# Patient Record
Sex: MALE | Race: WHITE | NOT HISPANIC OR LATINO | Employment: PART TIME | URBAN - METROPOLITAN AREA
[De-identification: names, ages, dates, MRNs, and addresses within clinical notes are randomized per-mention and may not be internally consistent; named-entity substitution may affect disease eponyms.]

---

## 2017-04-10 ENCOUNTER — HOSPITAL ENCOUNTER (EMERGENCY)
Facility: CLINIC | Age: 27
Discharge: HOME | End: 2017-04-10
Attending: EMERGENCY MEDICINE | Admitting: EMERGENCY MEDICINE
Payer: OTHER MISCELLANEOUS

## 2017-04-10 ENCOUNTER — APPOINTMENT (OUTPATIENT)
Dept: GENERAL RADIOLOGY | Facility: CLINIC | Age: 27
End: 2017-04-10
Attending: EMERGENCY MEDICINE
Payer: OTHER MISCELLANEOUS

## 2017-04-10 VITALS
TEMPERATURE: 97.6 F | RESPIRATION RATE: 18 BRPM | DIASTOLIC BLOOD PRESSURE: 90 MMHG | OXYGEN SATURATION: 98 % | SYSTOLIC BLOOD PRESSURE: 138 MMHG | HEART RATE: 103 BPM

## 2017-04-10 DIAGNOSIS — M25.531 RIGHT WRIST PAIN: ICD-10-CM

## 2017-04-10 DIAGNOSIS — M54.2 ACUTE NECK PAIN: ICD-10-CM

## 2017-04-10 PROCEDURE — 99283 EMERGENCY DEPT VISIT LOW MDM: CPT

## 2017-04-10 PROCEDURE — 73110 X-RAY EXAM OF WRIST: CPT | Mod: RT

## 2017-04-10 RX ORDER — METHYLPREDNISOLONE 4 MG
4 TABLET, DOSE PACK ORAL SEE ADMIN INSTRUCTIONS
Qty: 21 TABLET | Refills: 0 | Status: SHIPPED | OUTPATIENT
Start: 2017-04-10 | End: 2018-03-19

## 2017-04-10 RX ORDER — IBUPROFEN 600 MG/1
600 TABLET, FILM COATED ORAL ONCE
Status: DISCONTINUED | OUTPATIENT
Start: 2017-04-10 | End: 2017-04-10 | Stop reason: HOSPADM

## 2017-04-10 RX ORDER — CYCLOBENZAPRINE HCL 10 MG
10 TABLET ORAL 3 TIMES DAILY PRN
Qty: 20 TABLET | Refills: 0 | Status: SHIPPED | OUTPATIENT
Start: 2017-04-10 | End: 2017-04-16

## 2017-04-10 ASSESSMENT — ENCOUNTER SYMPTOMS
ARTHRALGIAS: 1
MYALGIAS: 1
NUMBNESS: 1

## 2017-04-10 NOTE — DISCHARGE INSTRUCTIONS
Discharge Instructions  Extremity Injury    You were seen today for an injury to an extremity (arm, hand, leg, or foot).   Return to the Emergency Department or see your regular doctor if your injured area is not back to normal within 5-7 days.    Return to the Emergency Department right away if:    Your pain seems to change or get worse or there is pain in a new area.    Your extremity becomes pale, cool, blue, or numb or tingling past the injury.    You have more drainage, redness or pain in the area of the cut or abrasion.    You have pain that you can t control with the medicine recommended or prescribed here, or you have pain that seems too much for your injury.    Your child will not stop crying or is much more fussy than normal.    You have new symptoms or anything that worries you.    What to Expect:    Your swelling and pain may be worse the day after your injury, but should not be severe and should start getting better after that. You should not have new symptoms and your pain should not get worse.    You may start to get a bruise over the injured area or below the injured area.    Your movement and strength should get better with time.    Some injuries may not show up until after you have left the Emergency Department so it is important to follow-up with your regular doctor.    Your injury may prevent you from working.  Follow-up with your regular doctor to get a work release note.    Pain medications or your injury may make it unsafe to drive or operate machinery.    Home Care:    Apply ice your injured area for 15 minutes at a time, at least 3 times a day. Use a cloth between the ice bag and your skin to prevent frostbite.     Do not sleep with an ice pack or heating pad on, since this can cause burns or skin injury.    Rest your injured area for at least 1-2 days. After that you may start using your extremity again as long as there is not too much pain.     Raise the injured area above the level of  your heart as much as possible in the first 1-2 days.    Use Tylenol  (acetaminophen), Motrin (ibuprofen), or Advil  (ibuprofen) for your pain unless you have an allergy or are told not to use these medications by your doctor.  Take the medications as instructed on the package. Tylenol  (acetaminophen) is in many prescription medicines and non-prescription medicines--check all of your medicines to be sure you aren t taking more than 3000 mg per day.    You may use an elastic bandage (Ace  Wrap) if it makes you more comfortable. Wrap it just tight enough to provide light compression, like a new pair of socks feels. Loosen the bandage if you have swelling past the bandage.      Please follow any other instructions that were discussed with you by your doctor.    MORE INFORMATION:    X-rays:  X-rays done today were read by your doctor but will also be read by a radiologist.  We will contact you if the radiologist sees anything different on the x-ray.  Your regular doctor may also want to review your x-rays on follow-up.    You could have a fracture (break), even if we told you your x-rays were normal. X-rays are not always certain, and some fractures are hard to see and may not show up right away.  Also, your x-ray may look like you have a fracture, even though you do not.  It is important to follow-up with your regular doctor.     Stretching:  If your injury was to your arm or shoulder and your doctor put you in a sling or an immobilizer, it is important that you take off your immobilizer within 3 days and stretch/move your shoulder, unless your doctor specifically tells you to not move your shoulder.  This is to prevent further injury such as a  frozen shoulder .     If you were given a prescription for medicine here today, be sure to read all of the information (including the package insert) that comes with your prescription.  This will include important information about the medicine, its side effects, and any  warnings that you need to know about.  The pharmacist who fills the prescription can provide more information and answer questions you may have about the medicine.  If you have questions or concerns that the pharmacist cannot address, please call or return to the Emergency Department.     Remember that you can always come back to the Emergency Department if you are not able to see your regular doctor in the amount of time listed above, if you get any new symptoms, or if there is anything that worries you.      Discharge Instructions  Neck Strain    You have been seen today for a neck sprain or strain.  Neck strains usually result from an injury to the neck. Car accidents, contact sports and falls are common causes of neck strain. Sometimes your neck can start to hurt because of increased activity, muscle tension, an abnormal sleeping position, or because of other problems like arthritis in the neck.     Neck pain usually comes from injured muscles and ligaments. Sometimes there is a herniated ( slipped ) disc. We don t usually do MRI scans to look for these right away, since most herniated discs will get better on their own with time. Today, we did not find any evidence that your neck pain was caused by a serious condition, such as an infection, fracture, or tumor. However, sometimes symptoms develop over time and cannot be found during an emergency visit, so it is very important that you follow up with your primary doctor.    Return to the Emergency Department if:    You have increasing pain in your neck.    You develop difficulty swallowing or breathing.    You have numbness, weakness, or trouble moving your arms or legs.    You have severe dizziness and difficulty walking.    You are unable to control your bladder or bowels.    You develop severe headache or ringing in the ears.    Call your doctor if:     Your neck pain is not controlled with the medicine we gave you.    You are not back to normal within 1  week.    What can I do to help myself at home?    If you had an injury, use cold for the first 1-2 days. Cold helps relieve pain and reduce inflammation.  Apply ice packs to the neck or areas of pain every 1-2 hours for 20 minutes at a time. Place a towel or cloth between your skin and the ice pack.    After the first 2 days, using heat can help with neck pain and stiffness. You may use a warm shower or bath, warm towels on the neck, or a heating pad. Do not sleep with a heating pad, as you can be burned.     Pain medications - You may take a pain medication such as Tylenol  (acetaminophen), Advil , Nuprin  (ibuprofen) or Aleve  (naproxen).  If you have been given a narcotic such as Vicodin  (hydrocodone with acetaminophen), Percocet  (oxycodone with acetaminophen), codeine, or a muscle relaxant such as Flexeril  (cyclobenzaprine) or Soma  (carisoprodol), do not drive for four hours after you have taken it. If the narcotic contains Tylenol  (acetaminophen), do not take Tylenol  with it. All narcotics will cause constipation, so eat a high fiber diet.      It is usually best to rest the neck for 1-2 days after an injury, then start gentle stretching exercises.     It is helpful to place a small pillow under the nape of your neck to provide proper neutral positioning.     You should stay active and do your usual work as much as you can, unless this involves heavy physical labor. Ask your doctor if you need work restrictions.  If you were given a prescription for medicine here today, be sure to read all of the information (including the package insert) that comes with your prescription.  This will include important information about the medicine, its side effects, and any warnings that you need to know about.  The pharmacist who fills the prescription can provide more information and answer questions you may have about the medicine.  If you have questions or concerns that the pharmacist cannot address, please call or  return to the Emergency Department.

## 2017-04-10 NOTE — ED AVS SNAPSHOT
[facility] Emergency Department    [address]    [facility] [address]    Phone:  [phone]    Fax:  [phone]                                       Vijay Garg   MRN: [id number]    Department:  [facility] Emergency Department   Date of Visit:  4/10/2017           After Visit Summary Signature Page     I have received my discharge instructions, and my questions have been answered. I have discussed any challenges I see with this plan with the nurse or doctor.    ..........................................................................................................................................  Patient/Patient Representative Signature      ..........................................................................................................................................  Patient Representative Print Name and Relationship to Patient    ..................................................               ................................................  Date                                            Time    ..........................................................................................................................................  Reviewed by Signature/Title    ...................................................              ..............................................  Date                                                            Time

## 2017-04-10 NOTE — ED PROVIDER NOTES
History     Chief Complaint:  Arm Pain      HPI   Vijay Garg is a 26 year old male who presents for evaluation of arm pain. The patient has a longstanding history of right forearm and wrist pain with numbness. The patient has been evaluated in the ED regarding this pain and numbness multiple times, once in November of 2015 and again in March of 2016. He reports that he has further been evaluated with testing for carpal tunnel, which was negative. He has also been seeing a chiropractor regarding this pain. Initially, he reports that he had some minor improvement of his pain with chiropractic adjustments, and he reports that his chiropractor expressed concern that his radius could be applying pressure to nerves, causing his pain. Today, due to ongoing intolerable pain, the patient presents to the ED seeking help with pain management and wondering what further resources are available to him. Currently in the ED, the patient rates his pain at a severity of 10/10. 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 He reports that he has never had an X-ray of his wrist to evaluate his pain.     Allergies:  NKDA     Medications:    The patient is not currently taking any prescribed medications.     Past Medical History:    Ibuprofen     Past Surgical History:    History reviewed. No pertinent past surgical history.     Family History:    History reviewed. No pertinent family history.     Social History:  Tobacco use:    Current every day smoker  Alcohol use:    Positive  Marital status:    Single   Accompanied to ED by:  Alone   Occupation:    The patient is a       Review of Systems   Musculoskeletal: Positive for arthralgias (right wrist) and myalgias (right forearm).   Neurological: Positive for numbness (right arm).   All other systems reviewed and are negative.    Physical  Exam   First Vitals:  BP: 138/90  Pulse: 103  Temp: 97.6  F (36.4  C)  Resp: 16  SpO2: 98 %      Physical Exam  Constitutional:  Oriented to person, place, and time. Well-appearing.   HENT:   Head:    Normocephalic.   Mouth/Throat:   Oropharynx is clear and moist.   Eyes:    EOM are normal. Pupils are equal, round, and reactive to light.   Neck:    Neck supple.   Musculoskeletal:  Normal range of motion.      2+ distal pulses.     No midline spinal tenderness.     Right cervical and trapezius paraspinal tenderness with spasming noted.      Mild tenderness to right wrist. Full range of motion. No deformity. No swelling or warmth.     5/5 strength, and sensation is fully intact to bilateral upper extremities.   Neurological:   Alert and oriented to person, place, and time.           Moves all 4 extremities spontaneously    Skin:    No rash noted. No pallor.      No swelling, erythema, or warmth noted to right wrist.     Emergency Department Course     Imaging:  Radiographic findings were communicated with the patient who voiced understanding of the findings.    Wrist XR:  IMPRESSION: No fractures are seen in the right wrist. Joints are preserved and in normal alignment.  Per radiology.     Emergency Department Course:  Nursing notes and vitals reviewed.  1058: I performed an exam of the patient as documented above.     Findings and plan explained to the Patient. Patient discharged home with instructions regarding supportive care, medications, and reasons to return. The importance of close follow-up was reviewed. The patient was prescribed Flexeril and a Medrol dosepak.      Impression & Plan      Medical Decision Making:  Vijay Garg is a 26 year old male who has been having ongoing right-sided neck and wrist pain. Differential includes peripheral neuropathy, referred neck pain, radiculopathy, or other causes. He has no acute weakness or numbness. He has no swelling or warmth and no concerns for septic arthritis or  gout. No concerns for neurologic compromise. No traumatic injury and/or need for imaging of the neck at this time. I did end up getting an X-ray which shows no acute findings. He does have a volar wrist splint, which he was advised to use. He will be discharged home on a course of steroids as well as flexeril for possible radiculopathy and is being referred to neurology for further workup. He was told to return for any acute weakness, numbness, or extreme pain.     Diagnosis:    ICD-10-CM   1. Right wrist pain M25.531   2. Acute neck pain M54.2       Disposition:  Discharged to home with Flexeril and a Medrol dosepak. Follow up with PMD.     Discharge Medications:  Discharge Medication List as of 4/10/2017 12:08 PM      START taking these medications    Details   cyclobenzaprine (FLEXERIL) 10 MG tablet Take 1 tablet (10 mg) by mouth 3 times daily as needed for muscle spasms, Disp-20 tablet, R-0, Local Print      methylPREDNISolone (MEDROL DOSEPAK) 4 MG tablet Take 1 tablet (4 mg) by mouth See Admin Instructions, Disp-21 tablet, R-0, Local Print             IPancho, am serving as a scribe at 10:58 AM on 4/10/2017 to document services personally performed by Dr. Stephenson, based on my observations and the provider's statements to me.    [facility] EMERGENCY DEPARTMENT       Kiel Stephenson MD  04/10/17 [id number]

## 2017-04-10 NOTE — ED NOTES
Patient presents to the ED with right arm pain. [facility] has been having problems with the arm for awhile due to a work injury. [facility] has been seeing chiropractor for arm pain and numbness.

## 2017-04-10 NOTE — ED AVS SNAPSHOT
[facility] Emergency Department    [address]    [facility] [address]    Phone:  [phone]    Fax:  [phone]                                       Vijay Garg   MRN: [id number]    Department:  [facility] Emergency Department   Date of Visit:  4/10/2017           Patient Information     Date Of Birth          1990        Your diagnoses for this visit were:     Right wrist pain     Acute neck pain        You were seen by Kiel Stephenson MD.      Follow-up Information     Follow up with [facility]. Call in 1 day.    Contact information:    [address] [facility] [id number] [phone]        Discharge Instructions         Discharge Instructions  Extremity Injury    You were seen today for an injury to an extremity (arm, hand, leg, or foot).   Return to the Emergency Department or see your regular doctor if your injured area is not back to normal within 5-7 days.    Return to the Emergency Department right away if:    Your pain seems to change or get worse or there is pain in a new area.    Your extremity becomes pale, cool, blue, or numb or tingling past the injury.    You have more drainage, redness or pain in the area of the cut or abrasion.    You have pain that you can t control with the medicine recommended or prescribed here, or you have pain that seems too much for your injury.    Your child will not stop crying or is much more fussy than normal.    You have new symptoms or anything that worries you.    What to Expect:    Your swelling and pain may be worse the day after your injury, but should not be severe and should start getting better after that. You should not have new symptoms and your pain should not get worse.    You may start to get a bruise over the injured area or below the injured area.    Your movement and strength should get better with time.    Some injuries may not show up until after you have left the  Emergency Department so it is important to follow-up with your regular doctor.    Your injury may prevent you from working.  Follow-up with your regular doctor to get a work release note.    Pain medications or your injury may make it unsafe to drive or operate machinery.    Home Care:    Apply ice your injured area for 15 minutes at a time, at least 3 times a day. Use a cloth between the ice bag and your skin to prevent frostbite.     Do not sleep with an ice pack or heating pad on, since this can cause burns or skin injury.    Rest your injured area for at least 1-2 days. After that you may start using your extremity again as long as there is not too much pain.     Raise the injured area above the level of your heart as much as possible in the first 1-2 days.    Use Tylenol  (acetaminophen), Motrin (ibuprofen), or Advil  (ibuprofen) for your pain unless you have an allergy or are told not to use these medications by your doctor.  Take the medications as instructed on the package. Tylenol  (acetaminophen) is in many prescription medicines and non-prescription medicines--check all of your medicines to be sure you aren t taking more than 3000 mg per day.    You may use an elastic bandage (Ace  Wrap) if it makes you more comfortable. Wrap it just tight enough to provide light compression, like a new pair of socks feels. Loosen the bandage if you have swelling past the bandage.      Please follow any other instructions that were discussed with you by your doctor.    MORE INFORMATION:    X-rays:  X-rays done today were read by your doctor but will also be read by a radiologist.  We will contact you if the radiologist sees anything different on the x-ray.  Your regular doctor may also want to review your x-rays on follow-up.    You could have a fracture (break), even if we told you your x-rays were normal. X-rays are not always certain, and some fractures are hard to see and may not show up right away.  Also, your x-ray  may look like you have a fracture, even though you do not.  It is important to follow-up with your regular doctor.     Stretching:  If your injury was to your arm or shoulder and your doctor put you in a sling or an immobilizer, it is important that you take off your immobilizer within 3 days and stretch/move your shoulder, unless your doctor specifically tells you to not move your shoulder.  This is to prevent further injury such as a  frozen shoulder .     If you were given a prescription for medicine here today, be sure to read all of the information (including the package insert) that comes with your prescription.  This will include important information about the medicine, its side effects, and any warnings that you need to know about.  The pharmacist who fills the prescription can provide more information and answer questions you may have about the medicine.  If you have questions or concerns that the pharmacist cannot address, please call or return to the Emergency Department.     Remember that you can always come back to the Emergency Department if you are not able to see your regular doctor in the amount of time listed above, if you get any new symptoms, or if there is anything that worries you.      Discharge Instructions  Neck Strain    You have been seen today for a neck sprain or strain.  Neck strains usually result from an injury to the neck. Car accidents, contact sports and falls are common causes of neck strain. Sometimes your neck can start to hurt because of increased activity, muscle tension, an abnormal sleeping position, or because of other problems like arthritis in the neck.     Neck pain usually comes from injured muscles and ligaments. Sometimes there is a herniated ( slipped ) disc. We don t usually do MRI scans to look for these right away, since most herniated discs will get better on their own with time. Today, we did not find any evidence that your neck pain was caused by a serious  condition, such as an infection, fracture, or tumor. However, sometimes symptoms develop over time and cannot be found during an emergency visit, so it is very important that you follow up with your primary doctor.    Return to the Emergency Department if:    You have increasing pain in your neck.    You develop difficulty swallowing or breathing.    You have numbness, weakness, or trouble moving your arms or legs.    You have severe dizziness and difficulty walking.    You are unable to control your bladder or bowels.    You develop severe headache or ringing in the ears.    Call your doctor if:     Your neck pain is not controlled with the medicine we gave you.    You are not back to normal within 1 week.    What can I do to help myself at home?    If you had an injury, use cold for the first 1-2 days. Cold helps relieve pain and reduce inflammation.  Apply ice packs to the neck or areas of pain every 1-2 hours for 20 minutes at a time. Place a towel or cloth between your skin and the ice pack.    After the first 2 days, using heat can help with neck pain and stiffness. You may use a warm shower or bath, warm towels on the neck, or a heating pad. Do not sleep with a heating pad, as you can be burned.     Pain medications - You may take a pain medication such as Tylenol  (acetaminophen), Advil , Nuprin  (ibuprofen) or Aleve  (naproxen).  If you have been given a narcotic such as Vicodin  (hydrocodone with acetaminophen), Percocet  (oxycodone with acetaminophen), codeine, or a muscle relaxant such as Flexeril  (cyclobenzaprine) or Soma  (carisoprodol), do not drive for four hours after you have taken it. If the narcotic contains Tylenol  (acetaminophen), do not take Tylenol  with it. All narcotics will cause constipation, so eat a high fiber diet.      It is usually best to rest the neck for 1-2 days after an injury, then start gentle stretching exercises.     It is helpful to place a small pillow under the nape of  your neck to provide proper neutral positioning.     You should stay active and do your usual work as much as you can, unless this involves heavy physical labor. Ask your doctor if you need work restrictions.  If you were given a prescription for medicine here today, be sure to read all of the information (including the package insert) that comes with your prescription.  This will include important information about the medicine, its side effects, and any warnings that you need to know about.  The pharmacist who fills the prescription can provide more information and answer questions you may have about the medicine.  If you have questions or concerns that the pharmacist cannot address, please call or return to the Emergency Department.         24 Hour Appointment Hotline       To make an appointment at any [facility], call [phone] ([phone]). If you don't have a family doctor or clinic, we will help you find one. [facility] clinics are conveniently located to serve the needs of you and your family.             Review of your medicines      START taking        Dose / Directions Last dose taken    cyclobenzaprine 10 MG tablet   Commonly known as:  FLEXERIL   Dose:  10 mg   Quantity:  20 tablet        Take 1 tablet (10 mg) by mouth 3 times daily as needed for muscle spasms   Refills:  0        methylPREDNISolone 4 MG tablet   Commonly known as:  MEDROL DOSEPAK   Dose:  4 mg   Quantity:  21 tablet        Take 1 tablet (4 mg) by mouth See Admin Instructions   Refills:  0                Prescriptions were sent or printed at these locations (2 Prescriptions)                   Other Prescriptions                Printed at Department/Unit printer (2 of 2)         cyclobenzaprine (FLEXERIL) 10 MG tablet               methylPREDNISolone (MEDROL DOSEPAK) 4 MG tablet                Procedures and tests performed during your visit     Wrist XR, G/E 3 views, right      Orders Needing Specimen Collection     None       Pending Results     No orders found from 4/8/2017 to 4/11/2017.            Pending Culture Results     No orders found from 4/8/2017 to 4/11/2017.            Test Results From Your Hospital Stay        4/10/2017 11:48 AM      Narrative     XR WRIST RT G/E 3 VW 4/10/2017 11:29 AM    COMPARISON: None.    HISTORY: Pain        Impression     IMPRESSION: No fractures are seen in the right wrist. Joints are  preserved and in normal alignment.    AVIVA MEADOWS                Clinical Quality Measure: Blood Pressure Screening     Your blood pressure was checked while you were in the emergency department today. The last reading we obtained was  BP: 138/90 . Please read the guidelines below about what these numbers mean and what you should do about them.  If your systolic blood pressure (the top number) is less than 120 and your diastolic blood pressure (the bottom number) is less than 80, then your blood pressure is normal. There is nothing more that you need to do about it.  If your systolic blood pressure (the top number) is 120-139 or your diastolic blood pressure (the bottom number) is 80-89, your blood pressure may be higher than it should be. You should have your blood pressure rechecked within a year by a primary care provider.  If your systolic blood pressure (the top number) is 140 or greater or your diastolic blood pressure (the bottom number) is 90 or greater, you may have high blood pressure. High blood pressure is treatable, but if left untreated over time it can put you at risk for heart attack, stroke, or kidney failure. You should have your blood pressure rechecked by a primary care provider within the next 4 weeks.  If your provider in the emergency department today gave you specific instructions to follow-up with your doctor or provider even sooner than that, you should follow that instruction and not wait for up to 4 weeks for your follow-up visit.        Thank you for choosing [facility]       Thank you  "for choosing [facility] for your care. Our goal is always to provide you with excellent care. Hearing back from our patients is one way we can continue to improve our services. Please take a few minutes to complete the written survey that you may receive in the mail after you visit with us. Thank you!        [employer] Information     [employer] lets you send messages to your doctor, view your test results, renew your prescriptions, schedule appointments and more. To sign up, go to [email or web address] . Click on \"Log in\" on the left side of the screen, which will take you to the Welcome page. Then click on \"Sign up Now\" on the right side of the page.     You will be asked to enter the access code listed below, as well as some personal information. Please follow the directions to create your username and password.     Your access code is: [email or web address]  Expires: 2017 11:59 AM     Your access code will  in 90 days. If you need help or a new code, please call your [facility] clinic or [phone].        Care EveryWhere ID     This is your Care EveryWhere ID. This could be used by other organizations to access your [facility] medical records  [id number]        After Visit Summary       This is your record. Keep this with you and show to your community pharmacist(s) and doctor(s) at your next visit.                  "

## 2018-03-19 ENCOUNTER — HOSPITAL ENCOUNTER (EMERGENCY)
Facility: CLINIC | Age: 28
Discharge: HOME | End: 2018-03-20
Attending: EMERGENCY MEDICINE | Admitting: EMERGENCY MEDICINE
Payer: COMMERCIAL

## 2018-03-19 DIAGNOSIS — M54.42 ACUTE LEFT-SIDED LOW BACK PAIN WITH LEFT-SIDED SCIATICA: ICD-10-CM

## 2018-03-19 DIAGNOSIS — S90.112A CONTUSION OF LEFT GREAT TOE WITHOUT DAMAGE TO NAIL, INITIAL ENCOUNTER: ICD-10-CM

## 2018-03-19 PROCEDURE — 99283 EMERGENCY DEPT VISIT LOW MDM: CPT

## 2018-03-19 PROCEDURE — 25000132 ZZH RX MED GY IP 250 OP 250 PS 637: Performed by: EMERGENCY MEDICINE

## 2018-03-19 RX ORDER — OXYCODONE AND ACETAMINOPHEN 5; 325 MG/1; MG/1
1-2 TABLET ORAL EVERY 4 HOURS PRN
Qty: 15 TABLET | Refills: 0 | Status: SHIPPED | OUTPATIENT
Start: 2018-03-19 | End: 2019-08-30

## 2018-03-19 RX ORDER — METHYLPREDNISOLONE 4 MG
8 TABLET, DOSE PACK ORAL SEE ADMIN INSTRUCTIONS
Qty: 21 TABLET | Refills: 0 | Status: SHIPPED | OUTPATIENT
Start: 2018-03-19 | End: 2019-08-30

## 2018-03-19 RX ORDER — OXYCODONE HYDROCHLORIDE 5 MG/1
10 TABLET ORAL ONCE
Status: COMPLETED | OUTPATIENT
Start: 2018-03-19 | End: 2018-03-19

## 2018-03-19 RX ORDER — IBUPROFEN 800 MG/1
800 TABLET, FILM COATED ORAL EVERY 8 HOURS PRN
Qty: 24 TABLET | Refills: 0 | Status: SHIPPED | OUTPATIENT
Start: 2018-03-19 | End: 2018-03-27

## 2018-03-19 RX ORDER — IBUPROFEN 800 MG/1
800 TABLET, FILM COATED ORAL ONCE
Status: COMPLETED | OUTPATIENT
Start: 2018-03-19 | End: 2018-03-19

## 2018-03-19 RX ADMIN — IBUPROFEN 800 MG: 800 TABLET, FILM COATED ORAL at 23:54

## 2018-03-19 RX ADMIN — OXYCODONE HYDROCHLORIDE 10 MG: 5 TABLET ORAL at 23:54

## 2018-03-19 ASSESSMENT — ENCOUNTER SYMPTOMS
DIFFICULTY URINATING: 0
WOUND: 0
FEVER: 0
MYALGIAS: 1
WEAKNESS: 0
NUMBNESS: 0
BACK PAIN: 1

## 2018-03-19 NOTE — ED AVS SNAPSHOT
[facility] Emergency Department    [address]    [facility] [address]    Phone:  [phone]    Fax:  [phone]                                       Vijay Garg   MRN: [id number]    Department:  [facility] Emergency Department   Date of Visit:  3/19/2018           After Visit Summary Signature Page     I have received my discharge instructions, and my questions have been answered. I have discussed any challenges I see with this plan with the nurse or doctor.    ..........................................................................................................................................  Patient/Patient Representative Signature      ..........................................................................................................................................  Patient Representative Print Name and Relationship to Patient    ..................................................               ................................................  Date                                            Time    ..........................................................................................................................................  Reviewed by Signature/Title    ...................................................              ..............................................  Date                                                            Time

## 2018-03-19 NOTE — ED AVS SNAPSHOT
[facility] Emergency Department    [address]    [facility] [address]    Phone:  [phone]    Fax:  [phone]                                       Vijay Garg   MRN: [id number]    Department:  [facility] Emergency Department   Date of Visit:  3/19/2018           Patient Information     Date Of Birth          1990        Your diagnoses for this visit were:     Acute left-sided low back pain with left-sided sciatica     Contusion of left great toe without damage to nail, initial encounter        You were seen by Vignesh Bailey MD.      Discharge References/Attachments     BACK PAIN W/ SCIATICA (ENGLISH)      24 Hour Appointment Hotline       To make an appointment at any [facility] clinic, call [phone] ([phone]). If you don't have a family doctor or clinic, we will help you find one. [facility] clinics are conveniently located to serve the needs of you and your family.             Review of your medicines      START taking        Dose / Directions Last dose taken    ibuprofen 800 MG tablet   Commonly known as:  ADVIL/MOTRIN   Dose:  800 mg   Quantity:  24 tablet        Take 1 tablet (800 mg) by mouth every 8 hours as needed for moderate pain   Refills:  0        methylPREDNISolone 4 MG tablet   Commonly known as:  MEDROL DOSEPAK   Dose:  8 mg   Quantity:  21 tablet        Take 2 tablets (8 mg) by mouth See Admin Instructions follow package directions   Refills:  0        oxyCODONE-acetaminophen 5-325 MG per tablet   Commonly known as:  PERCOCET   Dose:  1-2 tablet   Quantity:  15 tablet        Take 1-2 tablets by mouth every 4 hours as needed for moderate to severe pain   Refills:  0                Prescriptions were sent or printed at these locations (3 Prescriptions)                   Other Prescriptions                Printed at Department/Unit printer (3 of 3)         ibuprofen (ADVIL/MOTRIN) 800 MG tablet               oxyCODONE-acetaminophen  (PERCOCET) 5-325 MG per tablet               methylPREDNISolone (MEDROL DOSEPAK) 4 MG tablet                Orders Needing Specimen Collection     None      Pending Results     No orders found from 3/17/2018 to 3/20/2018.            Pending Culture Results     No orders found from 3/17/2018 to 3/20/2018.            Pending Results Instructions     If you had any lab results that were not finalized at the time of your Discharge, you can call the ED Lab Result RN at [phone]. You will be contacted by this team for any positive Lab results or changes in treatment. The nurses are available 7 days a week from 10A to 6:30P.  You can leave a message 24 hours per day and they will return your call.        Test Results From Your Hospital Stay               Clinical Quality Measure: Blood Pressure Screening     Your blood pressure was checked while you were in the emergency department today. The last reading we obtained was  BP: (!) 133/93 . Please read the guidelines below about what these numbers mean and what you should do about them.  If your systolic blood pressure (the top number) is less than 120 and your diastolic blood pressure (the bottom number) is less than 80, then your blood pressure is normal. There is nothing more that you need to do about it.  If your systolic blood pressure (the top number) is 120-139 or your diastolic blood pressure (the bottom number) is 80-89, your blood pressure may be higher than it should be. You should have your blood pressure rechecked within a year by a primary care provider.  If your systolic blood pressure (the top number) is 140 or greater or your diastolic blood pressure (the bottom number) is 90 or greater, you may have high blood pressure. High blood pressure is treatable, but if left untreated over time it can put you at risk for heart attack, stroke, or kidney failure. You should have your blood pressure rechecked by a primary care provider within the next 4 weeks.  If  "your provider in the emergency department today gave you specific instructions to follow-up with your doctor or provider even sooner than that, you should follow that instruction and not wait for up to 4 weeks for your follow-up visit.        Thank you for choosing [facility]       Thank you for choosing [facility] for your care. Our goal is always to provide you with excellent care. Hearing back from our patients is one way we can continue to improve our services. Please take a few minutes to complete the written survey that you may receive in the mail after you visit with us. Thank you!        [employer] Information     [employer] lets you send messages to your doctor, view your test results, renew your prescriptions, schedule appointments and more. To sign up, go to [email or web address] . Click on \"Log in\" on the left side of the screen, which will take you to the Welcome page. Then click on \"Sign up Now\" on the right side of the page.     You will be asked to enter the access code listed below, as well as some personal information. Please follow the directions to create your username and password.     Your access code is: [email or web address]  Expires: 2018 11:56 PM     Your access code will  in 90 days. If you need help or a new code, please call your [facility] clinic or [phone].        Care EveryWhere ID     This is your Care EveryWhere ID. This could be used by other organizations to access your [facility] medical records  [id number]        Equal Access to Services     AMELIE GARDNER : Hadii blair muñozo Caitlin, waaxda luqadaha, qaybta kaalmada iris, lesli collazo. So [facility] [phone].    ATENCIÓN: Si habla español, tiene a dewitt disposición servicios gratuitos de asistencia lingüística. Llame al [phone].    We comply with applicable federal civil rights laws and [place] laws. We do not discriminate on the basis of race, color, national origin, age, disability, sex, " sexual orientation, or gender identity.            After Visit Summary       This is your record. Keep this with you and show to your community pharmacist(s) and doctor(s) at your next visit.

## 2018-03-20 VITALS
DIASTOLIC BLOOD PRESSURE: 73 MMHG | TEMPERATURE: 99.4 F | SYSTOLIC BLOOD PRESSURE: 112 MMHG | OXYGEN SATURATION: 96 % | RESPIRATION RATE: 16 BRPM | HEART RATE: 85 BPM

## 2018-03-20 NOTE — ED PROVIDER NOTES
"  History     Chief Complaint:  Back Pain    HPI   Vijay Garg is a 27 year old male who presents with back pain. 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 Approximately two days ago the patient had an onset of atraumatic left back pain. He describes the pain as localizing in his left superior buttock, radiating down his posterior leg and wrapping around. Due to the pain he feels as if his leg is hard to bear weight on. He has had no loss of bowel/bladder and denies any numbness or true weakness as he has been ambulatory. Given his ongoing pain he presents here tonight. He denies any fevers, IV drug use, or pain in the right leg. He has been taking Aspirin and Tylenol at home but otherwise has not had anything else. Notably, the patient mentions four days ago he did suffer a slip and \"knee down\" when he jammed his left great toe causing some pain and swelling to the toe. He otherwise has had no recent injuries.    Allergies:  No known drug allergies     Medications:    The patient is currently on no regular medications.     Past Medical History:    The patient does not have any past pertinent medical history.     Past Surgical History:    History reviewed. No pertinent surgical history.     Family History:    History reviewed. No pertinent family history.      Social History:  Smoking Status: Current Smoker  Alcohol Use: yes  Patient presents alone.  Smokes marijuana      Review of Systems   Constitutional: Negative for fever.   Genitourinary: Negative for decreased urine volume and difficulty urinating.   Musculoskeletal: Positive for back pain and myalgias.   Skin: Negative for rash and wound.   Neurological: Negative for weakness and numbness.   All other systems reviewed and are negative.      Physical Exam     Patient Vitals for the past 24 hrs:   BP Temp Temp src Pulse Resp SpO2   03/19/18 " 2329  133/93 99.4  F (37.4  C) Temporal 85 18 99 %      Physical Exam  Nursing note and vitals reviewed.  Constitutional: Cooperative. Uncomfortable appearing holding left lumbar region.   HENT:   Mouth/Throat: Mucous membranes are normal.   Cardiovascular: Normal rate, regular rhythm and normal heart sounds.  No murmur.  Pulmonary/Chest: Effort normal and breath sounds normal. No respiratory distress. No wheezes. No rales.   Abdominal: Soft. Normal appearance and bowel sounds are normal. No distension. There is no tenderness. There is no rigidity and no guarding.   Musculoskeletal: Normal range of motion of LE's. Ecchymosis to the dorsum of the left great toe. No subungual hematoma.  Neurological: Alert. Normal strength and sensation in lower extremities.  Skin: Skin is warm and dry.   Psychiatric: Normal mood and affect.      Emergency Department Course     Past medical records, nursing notes, and vitals reviewed.   review: No level 2 Rx's in last 2 years.   2344: I performed an exam of the patient as documented above. Clinical findings and plan explained to the Patient. Patient discharged home with instructions regarding supportive care, medications, and reasons to return as well as the importance of close follow-up were reviewed.      Impression & Plan      Medical Decision Making:  Vijay Gagr is a 27 year old male who presents for evaluation of atraumatic left low back pain with radicular pain down the left leg. He did not sustain any trauma, therefore x-rays are not necessary due to the low likelihood of fracture or subluxation.There is no clinical evidence of cauda equina syndrome, discitis, spinal/epidural space hematoma or epidural abscess or other emergently worrisome etiology.     The neurological exam is normal with normal strength and sensation in both extremities. The patient was advised that radiculopathy often takes significant time to resolve, and that follow up with primary care, neurology  and/or neurosurgery will be indicated if symptoms do not improve. He will be discharged with a limited prescription of pain medications to use as directed.  No heavy lifting, bending or twisting. Return if increasing pain, muscular weakness, or bowel or bladder dysfunction.     Diagnosis:    ICD-10-CM   1. Acute left-sided low back pain with left-sided sciatica M54.42   2. Contusion of left great toe without damage to nail, initial encounter S90.112A     Discharge Medications:  New Prescriptions    IBUPROFEN (ADVIL/MOTRIN) 800 MG TABLET    Take 1 tablet (800 mg) by mouth every 8 hours as needed for moderate pain    METHYLPREDNISOLONE (MEDROL DOSEPAK) 4 MG TABLET    Take 2 tablets (8 mg) by mouth See Admin Instructions follow package directions    OXYCODONE-ACETAMINOPHEN (PERCOCET) 5-325 MG PER TABLET    Take 1-2 tablets by mouth every 4 hours as needed for moderate to severe pain       Marlon Mayfield  3/19/2018   [facility] EMERGENCY DEPARTMENT  I, Marlon Mayfield, am serving as a scribe at 11:44 PM on 3/19/2018 to document services personally performed by Vignesh Bailey MD based on my observations and the provider's statements to me.       Vignesh Bailey MD  03/20/18 0037

## 2018-05-09 ENCOUNTER — HOSPITAL ENCOUNTER (EMERGENCY)
Facility: CLINIC | Age: 28
Discharge: HOME | End: 2018-05-09
Attending: EMERGENCY MEDICINE | Admitting: EMERGENCY MEDICINE

## 2018-05-09 ENCOUNTER — APPOINTMENT (OUTPATIENT)
Dept: GENERAL RADIOLOGY | Facility: CLINIC | Age: 28
End: 2018-05-09
Attending: EMERGENCY MEDICINE

## 2018-05-09 VITALS
WEIGHT: 165 LBS | DIASTOLIC BLOOD PRESSURE: 88 MMHG | TEMPERATURE: 97.9 F | OXYGEN SATURATION: 100 % | SYSTOLIC BLOOD PRESSURE: 139 MMHG | HEIGHT: 71 IN | BODY MASS INDEX: 23.1 KG/M2 | RESPIRATION RATE: 16 BRPM

## 2018-05-09 DIAGNOSIS — S46.911A RIGHT SHOULDER STRAIN, INITIAL ENCOUNTER: ICD-10-CM

## 2018-05-09 PROCEDURE — 73030 X-RAY EXAM OF SHOULDER: CPT | Mod: RT

## 2018-05-09 PROCEDURE — 25000132 ZZH RX MED GY IP 250 OP 250 PS 637: Performed by: EMERGENCY MEDICINE

## 2018-05-09 PROCEDURE — 99283 EMERGENCY DEPT VISIT LOW MDM: CPT

## 2018-05-09 RX ORDER — CYCLOBENZAPRINE HCL 10 MG
10 TABLET ORAL 3 TIMES DAILY PRN
Qty: 20 TABLET | Refills: 0 | Status: SHIPPED | OUTPATIENT
Start: 2018-05-09 | End: 2018-05-15

## 2018-05-09 RX ORDER — IBUPROFEN 600 MG/1
600 TABLET, FILM COATED ORAL ONCE
Status: COMPLETED | OUTPATIENT
Start: 2018-05-09 | End: 2018-05-09

## 2018-05-09 RX ORDER — NAPROXEN 500 MG/1
500 TABLET ORAL 2 TIMES DAILY WITH MEALS
Qty: 16 TABLET | Refills: 0 | Status: SHIPPED | OUTPATIENT
Start: 2018-05-09 | End: 2018-05-17

## 2018-05-09 RX ADMIN — IBUPROFEN 600 MG: 600 TABLET ORAL at 09:27

## 2018-05-09 ASSESSMENT — ENCOUNTER SYMPTOMS
ARTHRALGIAS: 1
HEADACHES: 0

## 2018-05-09 NOTE — DISCHARGE INSTRUCTIONS
Muscle Strain in the Extremities  A muscle strain is a stretching and tearing of muscle fibers. This causes pain, especially when you move that muscle. There may also be some swelling and bruising.  Home care    Keep the hurt area raised to reduce pain and swelling. This is especially important during the first 48 hours.    Apply an ice pack over the injured area for 15 to 20 minutes every 3 to 6 hours. You should do this for the first 24 to 48 hours. You can make an ice pack by filling a plastic bag that seals at the top with ice cubes and then wrapping it with a thin towel. Be careful not to injure your skin with the ice treatments. Ice should never be applied directly to skin. Continue the use of ice packs for relief of pain and swelling as needed. After 48 hours, apply heat (warm shower or warm bath) for 15 to 20 minutes several times a day, or alternate ice and heat.    You may use over-the-counter pain medicine to control pain, unless another medicine was prescribed. If you have chronic liver or kidney disease or ever had a stomach ulcer or GI bleeding, talk with your healthcare provider before using these medicines.    For leg strains: If crutches have been recommended, don t put full weight on the hurt leg until you can do so without pain. You can return to sports when you are able to hop and run on the injured leg without pain.  Follow-up care  Follow up with your healthcare provider, or as advised.  When to seek medical advice  Call your healthcare provider right away if any of these occur:    The toes of the injured leg become swollen, cold, blue, numb, or tingly    Pain or swelling increases  Date Last Reviewed: 11/19/2015 2000-2017 [employer]. [address] [place] [address], [place] [address]. All rights reserved. This information is not intended as a substitute for professional medical care. Always follow your healthcare professional's instructions.          Treating Strains and  Sprains  Strains and sprains happen when muscles or other soft tissues near your bones stretch or tear. These injuries can cause bruising, swelling, and pain. To ease your discomfort and speed the healing of your strain or sprain, follow the tips below. Remember, a strain or sprain can take 6 to 8 weeks to heal.     Important Note: Do not give aspirin to children or teens without discussing it with your healthcare provider first.        Ice first, heat later    Use ice for the first 24 to 48 hours after injury. Ice helps prevent swelling and reduce pain. Ice the injury for no more than 20 minutes at a time and allow at least 20 minutes between icing sessions.    Apply heat after the first 72 hours, once the swelling has gone down. Heat relaxes muscles and increases blood flow. Soak the injured area in warm water or use a heating pad set on low for no more than 15 minutes at a time.  Wrap and elevate    Wrap an injured limb firmly with an elastic bandage. This provides support and helps prevent swelling. Don t wear an elastic bandage overnight. Watch for tingling, numbness, or increased pain. Remove the bandage immediately if any of these occurs.    Elevate the injured area to help reduce swelling and throbbing. It s best to raise an injured limb above the level of your heart.     Medicines    Over-the-counter medicines such as acetaminophen or ibuprofen can help reduce pain. Some also help reduce swelling.    Take medicine only as directed.    Rest the area even if medicines are controlling the pain.  Rest    Rest the injured area by not using it for 24 hours.    When you re ready, return slowly to your normal activities. Rest the injured area often.    Don t use or walk on an injured limb if it hurts.  Date Last Reviewed: 1/1/2018 2000-2017 [employer]. [address] [facility], [place] [address]. All rights reserved. This information is not intended as a substitute for professional medical care.  Always follow your healthcare professional's instructions.

## 2018-05-09 NOTE — LETTER
1990      To Whom it may concern:    Vijay Garg was seen in our Emergency Department today, 05/09/18 for an injury that was reported to be work related.      The injury occurred on 5/9/2018.  Diagnosis: right shoulder strain.      For the next 2 days she should not work.    After returning the following restrictions apply until 5/15/2018:  A) Bend: Frequently (4-8 hours)  B) Squat: Frequently (4-8 hours)  C) Walk/Stand: Frequently (4-8 hours)  D) Reach above shoulders: Not at all (0 hours)  E) Lift, carry, push and pull no more than: 0-10 lbs.    The employee might be taking medication so that they cannot operate moving machinery or perform activities that require balancing or working above ground.    Follow up: primary care or orthopedics.    Sincerely,

## 2018-05-09 NOTE — ED AVS SNAPSHOT
[facility] Emergency Department    [address]    [facility] [address]    Phone:  [phone]    Fax:  [phone]                                       Vijay Garg   MRN: [id number]    Department:  [facility] Emergency Department   Date of Visit:  5/9/2018           After Visit Summary Signature Page     I have received my discharge instructions, and my questions have been answered. I have discussed any challenges I see with this plan with the nurse or doctor.    ..........................................................................................................................................  Patient/Patient Representative Signature      ..........................................................................................................................................  Patient Representative Print Name and Relationship to Patient    ..................................................               ................................................  Date                                            Time    ..........................................................................................................................................  Reviewed by Signature/Title    ...................................................              ..............................................  Date                                                            Time

## 2018-05-09 NOTE — ED TRIAGE NOTES
Arrives to Ed with right shoulder pain he was working this am and garage door came down onto him, tightness in neck and shoulder pain it stopped on a prop before completely falling or hitting pt head, A/ox 3, ABC's intact.

## 2018-05-09 NOTE — ED PROVIDER NOTES
"  History     Chief Complaint:  Shoulder Injury    HPI   Vijay Garg is an otherwise healthy, right handed 27 year old male who presents with a shoulder injury. The patient reports he was at work carrying about 100 pounds of sheet rock and had a garage door crash down on his right shoulder. He states his arm was pushed down by the door as well as the weight of the sheet rock. He states he now has pain mostly in the top of his shoulder and right shoulder blade that is exacerbated when his moves his right arm. He denies hitting his head, losing consciousness, or elbow, wrist, or hand pain.     Allergies:  No known drug allergies      Medications:    The patient is currently on no regular medications.     Past Medical History:    The patient does not have any past pertinent medical history.     Past Surgical History:    History reviewed. No pertinent surgical history.     Family History:    History reviewed. No pertinent family history.      Social History:  Smoking status: Current every day smoker  Alcohol use: Yes  Marital Status:  Single [1]     Review of Systems   Musculoskeletal: Positive for arthralgias (right shoulder).   Neurological: Negative for syncope and headaches.   All other systems reviewed and are negative.    Physical Exam     Patient Vitals for the past 24 hrs:   BP Temp Temp src Heart Rate Resp SpO2 Height Weight   05/09/18 0858 139/88 97.9  F (36.6  C) Oral 95 16 100 % 1.803 m (5' 11\") 74.8 kg (165 lb)      Physical Exam   Constitutional: He appears well-developed and well-nourished.   Cardiovascular: Normal rate.    Pulmonary/Chest: Effort normal.   Musculoskeletal:        Arms:  Nursing note and vitals reviewed.        Emergency Department Course   Imaging:  Radiographic findings were communicated with the patient who voiced understanding of the findings.    XR Shoulder Right G/E 3 Views:  Negative.  As read by Radiology.     Interventions:  0927: Ibuprofen 600 mg PO     Emergency Department " Course:  Past medical records, nursing notes, and vitals reviewed.  0914: I performed an exam of the patient and obtained history, as documented above.  The patient was sent for a right shoulder x-ray while in the emergency department, findings above.     1002: I rechecked the patient. Explained findings to the patient.    Findings and plan explained to the Patient. Patient discharged home with instructions regarding supportive care, medications, and reasons to return. The importance of close follow-up was reviewed.     Impression & Plan    Medical Decision Making:  Patient presents with right shoulder injury at work.  Examination shows no deformity x-rays are negative.  Suspect shoulder strange as he caught a heavy door.  Cannot rule out posterior rotator cuff injury would recommend follow-up with memory care if pain continues Naprosyn and muscle relaxant was offered for pain.      Diagnosis:    ICD-10-CM   1. Right shoulder strain, initial encounter S46.911A     Disposition:  discharged to home    Discharge Medications:  New Prescriptions    CYCLOBENZAPRINE (FLEXERIL) 10 MG TABLET    Take 1 tablet (10 mg) by mouth 3 times daily as needed for muscle spasms    NAPROXEN (NAPROSYN) 500 MG TABLET    Take 1 tablet (500 mg) by mouth 2 times daily (with meals) for 8 days     Katelyn Simpson  5/9/2018   [facility] EMERGENCY DEPARTMENT    I, Katelyn Simpson, am serving as a scribe at 9:14 AM on 5/9/2018 to document services personally performed by Jerad Paul MD based on my observations and the provider's statements to me.       Jerad Paul MD  05/12/18 6550

## 2018-05-09 NOTE — ED AVS SNAPSHOT
[facility] Emergency Department    [address]    [facility] [address]    Phone:  [phone]    Fax:  [phone]                                       Vijay Garg   MRN: [id number]    Department:  [facility] Emergency Department   Date of Visit:  5/9/2018           Patient Information     Date Of Birth          1990        Your diagnoses for this visit were:     Right shoulder strain, initial encounter        You were seen by Jerad Paul MD.      Follow-up Information     Follow up with No Ref-Primary, Physician In 1 week.        Follow up with [facility] In 1 week.    Contact information:    [address]  [address]  [facility] [address] [phone]        Discharge Instructions         Muscle Strain in the Extremities  A muscle strain is a stretching and tearing of muscle fibers. This causes pain, especially when you move that muscle. There may also be some swelling and bruising.  Home care    Keep the hurt area raised to reduce pain and swelling. This is especially important during the first 48 hours.    Apply an ice pack over the injured area for 15 to 20 minutes every 3 to 6 hours. You should do this for the first 24 to 48 hours. You can make an ice pack by filling a plastic bag that seals at the top with ice cubes and then wrapping it with a thin towel. Be careful not to injure your skin with the ice treatments. Ice should never be applied directly to skin. Continue the use of ice packs for relief of pain and swelling as needed. After 48 hours, apply heat (warm shower or warm bath) for 15 to 20 minutes several times a day, or alternate ice and heat.    You may use over-the-counter pain medicine to control pain, unless another medicine was prescribed. If you have chronic liver or kidney disease or ever had a stomach ulcer or GI bleeding, talk with your healthcare provider before using these  medicines.    For leg strains: If crutches have been recommended, don t put full weight on the hurt leg until you can do so without pain. You can return to sports when you are able to hop and run on the injured leg without pain.  Follow-up care  Follow up with your healthcare provider, or as advised.  When to seek medical advice  Call your healthcare provider right away if any of these occur:    The toes of the injured leg become swollen, cold, blue, numb, or tingly    Pain or swelling increases  Date Last Reviewed: 11/19/2015 2000-2017 [employer]. [address] [place] [address]. All rights reserved. This information is not intended as a substitute for professional medical care. Always follow your healthcare professional's instructions.          Treating Strains and Sprains  Strains and sprains happen when muscles or other soft tissues near your bones stretch or tear. These injuries can cause bruising, swelling, and pain. To ease your discomfort and speed the healing of your strain or sprain, follow the tips below. Remember, a strain or sprain can take 6 to 8 weeks to heal.     Important Note: Do not give aspirin to children or teens without discussing it with your healthcare provider first.        Ice first, heat later    Use ice for the first 24 to 48 hours after injury. Ice helps prevent swelling and reduce pain. Ice the injury for no more than 20 minutes at a time and allow at least 20 minutes between icing sessions.    Apply heat after the first 72 hours, once the swelling has gone down. Heat relaxes muscles and increases blood flow. Soak the injured area in warm water or use a heating pad set on low for no more than 15 minutes at a time.  Wrap and elevate    Wrap an injured limb firmly with an elastic bandage. This provides support and helps prevent swelling. Don t wear an elastic bandage overnight. Watch for tingling, numbness, or increased pain. Remove the bandage immediately if  any of these occurs.    Elevate the injured area to help reduce swelling and throbbing. It s best to raise an injured limb above the level of your heart.     Medicines    Over-the-counter medicines such as acetaminophen or ibuprofen can help reduce pain. Some also help reduce swelling.    Take medicine only as directed.    Rest the area even if medicines are controlling the pain.  Rest    Rest the injured area by not using it for 24 hours.    When you re ready, return slowly to your normal activities. Rest the injured area often.    Don t use or walk on an injured limb if it hurts.  Date Last Reviewed: 1/1/2018 2000-2017 [employer]. [address] [place] [address], [place] [address]. All rights reserved. This information is not intended as a substitute for professional medical care. Always follow your healthcare professional's instructions.          24 Hour Appointment Hotline       To make an appointment at any [facility], call [phone] ([phone]). If you don't have a family doctor or clinic, we will help you find one. [facility] [place] clinics are conveniently located to serve the needs of you and your family.             Review of your medicines      START taking        Dose / Directions Last dose taken    cyclobenzaprine 10 MG tablet   Commonly known as:  FLEXERIL   Dose:  10 mg   Quantity:  20 tablet        Take 1 tablet (10 mg) by mouth 3 times daily as needed for muscle spasms   Refills:  0        naproxen 500 MG tablet   Commonly known as:  NAPROSYN   Dose:  500 mg   Quantity:  16 tablet        Take 1 tablet (500 mg) by mouth 2 times daily (with meals) for 8 days   Refills:  0          Our records show that you are taking the medicines listed below. If these are incorrect, please call your family doctor or clinic.        Dose / Directions Last dose taken    methylPREDNISolone 4 MG tablet   Commonly known as:  MEDROL DOSEPAK   Dose:  8 mg   Quantity:  21 tablet        Take 2 tablets (8 mg) by  mouth See Admin Instructions follow package directions   Refills:  0        oxyCODONE-acetaminophen 5-325 MG per tablet   Commonly known as:  PERCOCET   Dose:  1-2 tablet   Quantity:  15 tablet        Take 1-2 tablets by mouth every 4 hours as needed for moderate to severe pain   Refills:  0                Prescriptions were sent or printed at these locations (2 Prescriptions)                   Other Prescriptions                Printed at Department/Unit printer (2 of 2)         cyclobenzaprine (FLEXERIL) 10 MG tablet               naproxen (NAPROSYN) 500 MG tablet                Procedures and tests performed during your visit     XR Shoulder Right G/E 3 Views      Orders Needing Specimen Collection     None      Pending Results     No orders found from 5/7/2018 to 5/10/2018.            Pending Culture Results     No orders found from 5/7/2018 to 5/10/2018.            Pending Results Instructions     If you had any lab results that were not finalized at the time of your Discharge, you can call the ED Lab Result RN at [phone]. You will be contacted by this team for any positive Lab results or changes in treatment. The nurses are available 7 days a week from 10A to 6:30P.  You can leave a message 24 hours per day and they will return your call.        Test Results From Your Hospital Stay        5/9/2018  9:58 AM      Narrative     XR SHOULDER RT G/E 3 VW  5/9/2018 9:53 AM    HISTORY:  Work-related shoulder injury.    COMPARISON:  None.        Impression     IMPRESSION:  Negative.     ALBERTO TANNER MD                Clinical Quality Measure: Blood Pressure Screening     Your blood pressure was checked while you were in the emergency department today. The last reading we obtained was  BP: 139/88 . Please read the guidelines below about what these numbers mean and what you should do about them.  If your systolic blood pressure (the top number) is less than 120 and your diastolic blood pressure (the bottom number)  "is less than 80, then your blood pressure is normal. There is nothing more that you need to do about it.  If your systolic blood pressure (the top number) is 120-139 or your diastolic blood pressure (the bottom number) is 80-89, your blood pressure may be higher than it should be. You should have your blood pressure rechecked within a year by a primary care provider.  If your systolic blood pressure (the top number) is 140 or greater or your diastolic blood pressure (the bottom number) is 90 or greater, you may have high blood pressure. High blood pressure is treatable, but if left untreated over time it can put you at risk for heart attack, stroke, or kidney failure. You should have your blood pressure rechecked by a primary care provider within the next 4 weeks.  If your provider in the emergency department today gave you specific instructions to follow-up with your doctor or provider even sooner than that, you should follow that instruction and not wait for up to 4 weeks for your follow-up visit.        Thank you for choosing [facility]       Thank you for choosing [facility] for your care. Our goal is always to provide you with excellent care. Hearing back from our patients is one way we can continue to improve our services. Please take a few minutes to complete the written survey that you may receive in the mail after you visit with us. Thank you!        [employer] Information     [employer] lets you send messages to your doctor, view your test results, renew your prescriptions, schedule appointments and more. To sign up, go to [email or web address] . Click on \"Log in\" on the left side of the screen, which will take you to the Welcome page. Then click on \"Sign up Now\" on the right side of the page.     You will be asked to enter the access code listed below, as well as some personal information. Please follow the directions to create your username and password.     Your access code is: [email or web address]  Expires: 6/17/2018 " 11:56 PM     Your access code will  in 90 days. If you need help or a new code, please call your [facility] clinic or [phone].        Care EveryWhere ID     This is your Care EveryWhere ID. This could be used by other organizations to access your [facility] medical records  [id number]        Equal Access to Services     AMELIE GARDNER : Danna Tucker, norma harris, danny simmons, elsli epstein . So [facility] [phone].    ATENCIÓN: Si habla español, tiene a dewitt disposición servicios gratuitos de asistencia lingüística. Llame al [phone].    We comply with applicable federal civil rights laws and [place] laws. We do not discriminate on the basis of race, color, national origin, age, disability, sex, sexual orientation, or gender identity.            After Visit Summary       This is your record. Keep this with you and show to your community pharmacist(s) and doctor(s) at your next visit.

## 2019-05-01 ENCOUNTER — HOSPITAL ENCOUNTER (EMERGENCY)
Facility: CLINIC | Age: 29
Discharge: HOME | End: 2019-05-01
Attending: PHYSICIAN ASSISTANT | Admitting: PHYSICIAN ASSISTANT
Payer: COMMERCIAL

## 2019-05-01 ENCOUNTER — APPOINTMENT (OUTPATIENT)
Dept: GENERAL RADIOLOGY | Facility: CLINIC | Age: 29
End: 2019-05-01
Attending: PHYSICIAN ASSISTANT
Payer: COMMERCIAL

## 2019-05-01 VITALS
RESPIRATION RATE: 20 BRPM | WEIGHT: 178.57 LBS | DIASTOLIC BLOOD PRESSURE: 91 MMHG | SYSTOLIC BLOOD PRESSURE: 125 MMHG | TEMPERATURE: 98 F | HEIGHT: 72 IN | BODY MASS INDEX: 24.19 KG/M2 | OXYGEN SATURATION: 100 %

## 2019-05-01 DIAGNOSIS — S63.642A SPRAIN OF METACARPOPHALANGEAL (MCP) JOINT OF LEFT THUMB, INITIAL ENCOUNTER: ICD-10-CM

## 2019-05-01 DIAGNOSIS — M75.41 IMPINGEMENT SYNDROME OF SHOULDER REGION, RIGHT: ICD-10-CM

## 2019-05-01 DIAGNOSIS — G89.29 CHRONIC LEFT-SIDED LOW BACK PAIN WITH LEFT-SIDED SCIATICA: ICD-10-CM

## 2019-05-01 DIAGNOSIS — R20.2 PARESTHESIAS: ICD-10-CM

## 2019-05-01 DIAGNOSIS — M54.42 CHRONIC LEFT-SIDED LOW BACK PAIN WITH LEFT-SIDED SCIATICA: ICD-10-CM

## 2019-05-01 PROCEDURE — 29125 APPL SHORT ARM SPLINT STATIC: CPT | Mod: FA

## 2019-05-01 PROCEDURE — 99284 EMERGENCY DEPT VISIT MOD MDM: CPT

## 2019-05-01 PROCEDURE — 73140 X-RAY EXAM OF FINGER(S): CPT | Mod: LT

## 2019-05-01 RX ORDER — METHYLPREDNISOLONE 4 MG
TABLET, DOSE PACK ORAL
Qty: 21 TABLET | Refills: 0 | Status: SHIPPED | OUTPATIENT
Start: 2019-05-01 | End: 2019-08-30

## 2019-05-01 RX ORDER — LIDOCAINE 50 MG/G
1 PATCH TOPICAL EVERY 24 HOURS
Qty: 10 PATCH | Refills: 0 | Status: SHIPPED | OUTPATIENT
Start: 2019-05-01 | End: 2019-09-01

## 2019-05-01 ASSESSMENT — ENCOUNTER SYMPTOMS
NECK PAIN: 0
WEAKNESS: 0
FEVER: 0
BACK PAIN: 1
NUMBNESS: 1

## 2019-05-01 ASSESSMENT — MIFFLIN-ST. JEOR: SCORE: 1828

## 2019-05-01 NOTE — ED AVS SNAPSHOT
[facility] Emergency Department  [address]  [facility] [address]  Phone:  [phone]  Fax:  [phone]                                    Vijay Garg   MRN: [id number]    Department:  [facility] Emergency Department   Date of Visit:  5/1/2019           After Visit Summary Signature Page    I have received my discharge instructions, and my questions have been answered. I have discussed any challenges I see with this plan with the nurse or doctor.    ..........................................................................................................................................  Patient/Patient Representative Signature      ..........................................................................................................................................  Patient Representative Print Name and Relationship to Patient    ..................................................               ................................................  Date                                   Time    ..........................................................................................................................................  Reviewed by Signature/Title    ...................................................              ..............................................  Date                                               Time          22EPIC Rev 08/18

## 2019-05-01 NOTE — ED PROVIDER NOTES
History     Chief Complaint:  Arm Pain    HPI:   The history is provided by the patient.      Vijay Garg is a 28 year old male who presents with right arm pain. The patient states he has been dealing with right shoulder and arm pain since falling from a ladder onto his right shoulder several years ago. He states that since then, he has had intermittent pain that shoots down his right arm from his right shoulder along with intermittent right arm numbness and right finger numbness. The patient states that his fingers will intermittently become very cold as well. He states that he also smashed his left thumb while at work last month and has left thumb pain. The patient states that his right arm pain has recently exacerbated, prompting his evaluation. He denies any weakness or inability to perform daily tasks. The patient states that he has also had low back pain with radiation down his left leg for some time, and this has also exacerbated recently. No leg weakness, no urinary incontinence, and no groin numbness. The patient has been seen here in the ED for this pain several times, and he states he has also followed with orthopedics in the past. The patient last had X-ray imaging of his right shoulder 6 months ago, which was negative for fracture, as per the note below.     XR Shoulder Right G/E 3 Views  (5/9/2018)  Impression: Negative.  As read by Radiology.    Allergies:  No known drug allergies      Medications:    The patient is not currently taking any prescribed medications.     Past Medical History:    The patient does not have any past pertinent medical history.     Past Surgical History:    History reviewed. No pertinent surgical history.     Family History:    History reviewed. No pertinent family history.      Social History:  No current PCP.  Marital Status:  Single  Smoking status: current every day smoker  Alcohol use: yes  Drug use: positive for marijuana      Review of Systems   Constitutional:  Negative for fever.   Musculoskeletal: Positive for back pain. Negative for neck pain.        Right shoulder and arm pain   Neurological: Positive for numbness. Negative for weakness.   All other systems reviewed and are negative.    Physical Exam     Patient Vitals for the past 24 hrs:   BP Temp Temp src Heart Rate Resp SpO2 Height Weight   05/01/19 1208 (!) 125/91 98  F (36.7  C) Oral 74 20 100 % 1.829 m (6') 81 kg (178 lb 9.2 oz)        Physical Exam  General: Alert and interactive. Appears well. Cooperative and pleasant.   Eyes: The pupils are equal and round. EOMs intact. No scleral icterus.  ENT: No abnormalities to the external nose or ears. Mucous membranes moist. Posterior oropharynx is non-erythematous.      Neck: Trachea is in the midline. No nuchal rigidity.     CV: Regular rate and rhythm. S1 and S2 normal without murmur, click, gallop or rub. Radial pulse is 2+ in RUE. Capillary refill is brisk in fingers.   Resp: Breath sounds are clear bilaterally, without rhonchi, wheezes, rales. Non-labored, no retractions or accessory muscle use.     GI: Abdomen is soft without distension. No tenderness to palpation. No peritoneal signs.    MS: Moving all extremities well. Good muscle tone. Full strength in upper extremities.   Right UE: Positive empty can test to right upper extremity. Negative Neer sign. No tenderness along clavicle, AC joint, humeral head. Full ulnar and radial nerve function at the hand with resistance to flexion and extension. Full bicep and tricep strength in right UE.   Full strength in lower extremities, including with dorsiflexion and plantar flexion at the feet.   Left sided paraspinal lumbar tenderness to palpation. No midline cervical/thoracic/lumbar midline tenderness to palpation.   Skin: Warm and dry. No rash or lesions noted.  Neuro: Alert and oriented x 3. No focal neurologic deficits. Good strength and sensation in upper and lower extremities. Full median/ulnar/radial nerve  function in hand. Patellar reflexes are normal bilaterally.    Psych: Awake. Alert.  Normal affect. Appropriate interactions.  Lymph: No anterior or posterior cervical lymphadenopathy noted.    Emergency Department Course     Imaging:  Radiographic findings were communicated with the patient who voiced understanding of the findings.    Fingers XR, 2-3 views, left  Impression: No osseous abnormality.  As read by Radiology.     Interventions:  The patient is not currently taking any prescribed medications.     Emergency Department Course:  Past medical records, nursing notes, and vitals reviewed.  1228: I performed an exam of the patient and obtained history, as documented above.   The patient was sent for X-ray imaging while in the emergency department, findings above.     1325: I rechecked the patient. Explained findings to the patient.    I rechecked the patient. Findings and plan explained to the Patient. Patient discharged home with instructions regarding supportive care, medications, and reasons to return. The importance of close follow-up was reviewed.       Impression & Plan      Medical Decision Making:  Vijay Garg is a 28 year old male who presents for evaluation of multiple complaints as listed below.     1. The patient has chronic right shoulder pain with paresthesias along the ulnar nerve distribution. He has been seen for this multiple times after an injury 2 years ago where he fell off of some stilts, landing on his right shoulder. He had X-ray at that time that was negative but continues to have issues with ROM and feels paresthesias in the 5th and 4th digits of his right hand. On examination here he has no bony abnormality and has full motor and nerve function in his upper extremity. He does have a positive empty can test and I am suspicious that he has impingement syndrome in his right shoulder with residual problems along the ulnar nerve. No concerns for arthritis, gout, ischemia, DVT of RUE, as  this is clinically unlikely. No fever/chills or erythema. He needs ortho follow up for possible advanced imaging and definitive management with physical therapy and/or surgical consultation.     2. Patient complaints of left thumb pain after he crushed his thumb several months ago. X-ray here is negative for fracture or dislocation, and I think that this is likely a strain of his metacarpal phalangeal joint. He is using this often when he works and I am suspicious that he keeps straining his thumb. 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 I placed him in a thumb spica removable splint for comfort.     3. Final complaint is left sided low back pain with left sided sciatica. This is been going on for quiet some time as well. He has not been seen by orthopedics at all. He says when he has the left pain it spasms and then extends down the back side of his left lower extremity. He has full motor and sensory function of his nerves of the lower extremities. He denies any incontinence and has no saddle anesthesia, and I am not suspicious for causa equina. Additionally, he has no fevers or chills to suggest for meningitis or epidural abscess or hematoma. Not an IV drug user, nor does he have any history of cancer and I am not suspicious at all for any infectious or cancerous process. I do believe the patient is stable for discharge at this time with close outpatient follow up. Medrol Dosepak for both his shoulder and back as well as lidocaine patches to be used for back spasms. He has not had very good luck with muscle relaxants in the past. He will follow up with orthopedics for all 3 of his problems, although I did note that his shoulder is probably the symptom he should focus on. He will return here with any persistent weakness, fevers, chills, or other worsening symptoms.     Critical Care time:  none    Diagnosis:    ICD-10-CM    1. Impingement syndrome of shoulder  region, right M75.41    2. Paresthesias R20.2    3. Chronic left-sided low back pain with left-sided sciatica M54.42     G89.29    4. Sprain of metacarpophalangeal (MCP) joint of left thumb, initial encounter S63.079Z        Disposition:  discharged to home    Discharge Medications:     Medication List      Started    lidocaine 5 % patch  Commonly known as:  LIDODERM  1 patch, Transdermal, EVERY 24 HOURS      methylPREDNISolone 4 MG tablet therapy pack  Commonly known as:  MEDROL DOSEPAK  8 mg, Oral, SEE ADMIN INSTRUCTIONS, follow package directions           I, Megan Beh, am serving as a scribe at 12:28 PM on 5/1/2019 to document services personally performed by Anabel Trinidad PA-C based on my observations and the provider's statements to me.      Megan Beh  5/1/2019   [facility] EMERGENCY DEPARTMENT       Anabel Trinidad PA-C  05/01/19 2001

## 2019-05-01 NOTE — DISCHARGE INSTRUCTIONS
Begin Tylenol/Ibuprofen for pain.   Take Medrol DosePak. Lidocaine patches fore pain as well.   Follow up with orthopedics ASAP.     Discharge Instructions  Back Pain  You were seen today for back pain. Back pain can have many causes, but most will get better without surgery or other specific treatment. Sometimes there is a herniated (?slipped?) disc. We do not usually do MRI scans to look for these right away, since most herniated discs will get better on their own with time.  Today, we did not find any evidence that your back pain was caused by a serious condition. However, sometimes symptoms develop over time and cannot be found during an emergency visit, so it is very important that you follow up with your primary provider.  Generally, every Emergency Department visit should have a follow-up clinic visit with either a primary or a specialty clinic/provider. Please follow-up as instructed by your emergency provider today.    Return to the Emergency Department if:  You develop a fever with your back pain.   You have weakness or change in sensation in one or both legs.  You lose control of your bowels or bladder, or cannot empty your bladder (cannot pee).  Your pain gets much worse.     Follow-up with your provider:  Unless your pain has completely gone away, please make an appointment with your provider within one week. Most of the routine care for back pain is available in a clinic and not the Emergency Department. You may need further management of your back pain, such as more pain medication, imaging such as an X-ray or MRI, or physical therapy.    What can I do to help myself?  Remain Active -- People are often afraid that they will hurt their back further or delay recovery by remaining active, but this is one of the best things you can do for your back. In fact, staying in bed for a long time to rest is not recommended. Studies have shown that people with low back pain recover faster when they remain active.  Movement helps to bring blood flow to the muscles and relieve muscle spasms as well as preventing loss of muscle strength.  Heat -- Using a heating pad can help with low back pain during the first few weeks. Do not sleep with a heating pad, as you can be burned.   Pain medications - You may take a pain medication such as Tylenol  (acetaminophen), Advil , Motrin  (ibuprofen) or Aleve  (naproxen).  If you were given a prescription for medicine here today, be sure to read all of the information (including the package insert) that comes with your prescription.  This will include important information about the medicine, its side effects, and any warnings that you need to know about.  The pharmacist who fills the prescription can provide more information and answer questions you may have about the medicine.  If you have questions or concerns that the pharmacist cannot address, please call or return to the Emergency Department.   Remember that you can always come back to the Emergency Department if you are not able to see your regular provider in the amount of time listed above, if you get any new symptoms, or if there is anything that worries you.

## 2019-05-01 NOTE — ED TRIAGE NOTES
Pt has pain in right arm for past couple years.  He reports falling while hanging drywall.  He also has pain in left lower back radiating down leg.

## 2019-08-28 ENCOUNTER — NURSE TRIAGE (OUTPATIENT)
Dept: NURSING | Facility: CLINIC | Age: 29
End: 2019-08-28

## 2019-08-28 ENCOUNTER — HOSPITAL ENCOUNTER (EMERGENCY)
Facility: CLINIC | Age: 29
Discharge: HOME | End: 2019-08-28
Attending: EMERGENCY MEDICINE | Admitting: EMERGENCY MEDICINE
Payer: COMMERCIAL

## 2019-08-28 VITALS
SYSTOLIC BLOOD PRESSURE: 113 MMHG | HEART RATE: 86 BPM | DIASTOLIC BLOOD PRESSURE: 92 MMHG | OXYGEN SATURATION: 94 % | RESPIRATION RATE: 20 BRPM | TEMPERATURE: 99.3 F

## 2019-08-28 DIAGNOSIS — R11.2 NAUSEA AND VOMITING, INTRACTABILITY OF VOMITING NOT SPECIFIED, UNSPECIFIED VOMITING TYPE: ICD-10-CM

## 2019-08-28 LAB
ALBUMIN SERPL-MCNC: 3 G/DL (ref 3.4–5)
ALP SERPL-CCNC: 80 U/L (ref 40–150)
ALT SERPL W P-5'-P-CCNC: 14 U/L (ref 0–70)
ANION GAP SERPL CALCULATED.3IONS-SCNC: 6 MMOL/L (ref 3–14)
AST SERPL W P-5'-P-CCNC: 22 U/L (ref 0–45)
BASOPHILS # BLD AUTO: 0 10E9/L (ref 0–0.2)
BASOPHILS NFR BLD AUTO: 0.2 %
BILIRUB SERPL-MCNC: 0.7 MG/DL (ref 0.2–1.3)
BUN SERPL-MCNC: 13 MG/DL (ref 7–30)
CALCIUM SERPL-MCNC: 9.3 MG/DL (ref 8.5–10.1)
CHLORIDE SERPL-SCNC: 105 MMOL/L (ref 94–109)
CO2 SERPL-SCNC: 25 MMOL/L (ref 20–32)
CREAT SERPL-MCNC: 0.66 MG/DL (ref 0.66–1.25)
DIFFERENTIAL METHOD BLD: ABNORMAL
EOSINOPHIL # BLD AUTO: 0.2 10E9/L (ref 0–0.7)
EOSINOPHIL NFR BLD AUTO: 1.3 %
ERYTHROCYTE [DISTWIDTH] IN BLOOD BY AUTOMATED COUNT: 12.1 % (ref 10–15)
GFR SERPL CREATININE-BSD FRML MDRD: >90 ML/MIN/{1.73_M2}
GLUCOSE SERPL-MCNC: 102 MG/DL (ref 70–99)
HCT VFR BLD AUTO: 38.6 % (ref 40–53)
HGB BLD-MCNC: 12.9 G/DL (ref 13.3–17.7)
IMM GRANULOCYTES # BLD: 0.1 10E9/L (ref 0–0.4)
IMM GRANULOCYTES NFR BLD: 0.4 %
LIPASE SERPL-CCNC: 31 U/L (ref 73–393)
LYMPHOCYTES # BLD AUTO: 0.7 10E9/L (ref 0.8–5.3)
LYMPHOCYTES NFR BLD AUTO: 5.8 %
MCH RBC QN AUTO: 28.9 PG (ref 26.5–33)
MCHC RBC AUTO-ENTMCNC: 33.4 G/DL (ref 31.5–36.5)
MCV RBC AUTO: 87 FL (ref 78–100)
MONOCYTES # BLD AUTO: 0.2 10E9/L (ref 0–1.3)
MONOCYTES NFR BLD AUTO: 1.4 %
NEUTROPHILS # BLD AUTO: 10.2 10E9/L (ref 1.6–8.3)
NEUTROPHILS NFR BLD AUTO: 90.9 %
NRBC # BLD AUTO: 0 10*3/UL
NRBC BLD AUTO-RTO: 0 /100
PLATELET # BLD AUTO: 253 10E9/L (ref 150–450)
POTASSIUM SERPL-SCNC: 3.6 MMOL/L (ref 3.4–5.3)
PROT SERPL-MCNC: 7.7 G/DL (ref 6.8–8.8)
RBC # BLD AUTO: 4.46 10E12/L (ref 4.4–5.9)
SODIUM SERPL-SCNC: 136 MMOL/L (ref 133–144)
WBC # BLD AUTO: 11.2 10E9/L (ref 4–11)

## 2019-08-28 PROCEDURE — 80053 COMPREHEN METABOLIC PANEL: CPT | Performed by: EMERGENCY MEDICINE

## 2019-08-28 PROCEDURE — 83690 ASSAY OF LIPASE: CPT | Performed by: EMERGENCY MEDICINE

## 2019-08-28 PROCEDURE — 96361 HYDRATE IV INFUSION ADD-ON: CPT

## 2019-08-28 PROCEDURE — 25000128 H RX IP 250 OP 636: Performed by: EMERGENCY MEDICINE

## 2019-08-28 PROCEDURE — 85025 COMPLETE CBC W/AUTO DIFF WBC: CPT | Performed by: EMERGENCY MEDICINE

## 2019-08-28 PROCEDURE — 99284 EMERGENCY DEPT VISIT MOD MDM: CPT | Mod: 25

## 2019-08-28 PROCEDURE — 96374 THER/PROPH/DIAG INJ IV PUSH: CPT

## 2019-08-28 RX ORDER — ONDANSETRON 2 MG/ML
4 INJECTION INTRAMUSCULAR; INTRAVENOUS EVERY 30 MIN PRN
Status: DISCONTINUED | OUTPATIENT
Start: 2019-08-28 | End: 2019-08-28 | Stop reason: HOSPADM

## 2019-08-28 RX ORDER — ONDANSETRON 8 MG/1
8 TABLET, ORALLY DISINTEGRATING ORAL EVERY 6 HOURS PRN
Qty: 10 TABLET | Refills: 0 | Status: SHIPPED | OUTPATIENT
Start: 2019-08-28 | End: 2019-09-01

## 2019-08-28 RX ORDER — SODIUM CHLORIDE 9 MG/ML
1000 INJECTION, SOLUTION INTRAVENOUS CONTINUOUS
Status: DISCONTINUED | OUTPATIENT
Start: 2019-08-28 | End: 2019-08-28 | Stop reason: HOSPADM

## 2019-08-28 RX ADMIN — ONDANSETRON 4 MG: 2 INJECTION INTRAMUSCULAR; INTRAVENOUS at 13:04

## 2019-08-28 RX ADMIN — SODIUM CHLORIDE 1000 ML: 9 INJECTION, SOLUTION INTRAVENOUS at 13:03

## 2019-08-28 ASSESSMENT — ENCOUNTER SYMPTOMS
VOMITING: 1
HEADACHES: 1
NAUSEA: 1
SHORTNESS OF BREATH: 1

## 2019-08-28 NOTE — ED PROVIDER NOTES
History     Chief Complaint:  Multiple Complaints      HPI   Vijay Garg is a 28 year old male who presents with Multiple Complaints. Per chart review, the patient had carpal tunnel release 13 days ago at [facility]. He notes that he has been multiple bouts of emesis and nausea since yesterday around 1200. He came to the ED due to concern for dehydration, headache, shortness of breath, and body aches. He notes that he also had blood work done 5 days ago and that all his test came back negative, lyme's, blood work, and cultures and was placed on Doxycycline. He states that he has not been feeling better and has been taking Tylenol and ibuprofen. He denies rashes or penile pain or swelling.       [facility] 08/25/19:  CBC: WBC 10.7(H), HGB 14.6,   UA with micro: trace blood in urine, moderate mucous,  o/w negative   CMP: Glucose 108.3(H), CRP 12.25(H), o/w WNL (Creatinine: 1.09)  Lyme's: negative  Blood cultures: negative, x2  Strep: negative    Chest XR PA&LAT 8/25/19:  No acute disease.       Allergies:  Sulfa Drugs    Medications:    Doxycycline    Past Medical History:    Depression    Past Surgical History:    The patient does not have any pertinent past surgical history.     Family History:    No past pertinent family history.     Social History:  Smoking status: Current every day smoker  Alcohol use: Yes  Drug use: Yes, marijuana  PCP: Physician No Ref-Primary  Presents to the ED with fiance   Marital Status:  Single [1]       Review of Systems   Respiratory: Positive for shortness of breath.    Gastrointestinal: Positive for nausea and vomiting.   Genitourinary: Negative for penile pain, penile swelling, scrotal swelling and testicular pain.   Neurological: Positive for headaches.   All other systems reviewed and are negative.        Physical Exam     Patient Vitals for the past 24 hrs:   BP Temp Temp src Pulse Resp SpO2   08/28/19 1312 (!) 113/92 -- -- 86 --  94 %   08/28/19 1223 114/75 -- -- 89 -- 95 %   08/28/19 1153 121/74 99.3  F (37.4  C) Oral 99 20 95 %        Physical Exam  Constitutional: Patient is well appearing. No distress.  Head: Atraumatic.  Mouth/Throat: Oropharynx is clear and moist. No oropharyngeal exudate.  Eyes: Conjunctivae and EOM are normal. No scleral icterus.  Neck: Normal range of motion. Neck supple.   Cardiovascular: Normal rate, regular rhythm, normal heart sounds and intact distal pulses.   Pulmonary/Chest: Breath sounds normal. No respiratory distress.  Abdominal: Soft. Bowel sounds are normal. No distension. No tenderness. No rebound or guarding.   Musculoskeletal: Normal range of motion. No edema or tenderness.   Neurological: Alert and orientated to person, place, and time. No observable focal neuro deficit  Skin: Warm and dry. No rash noted. Not diaphoretic. Distal wrist and proximal elbow clean dry and intact sutures as well as the joints.    Emergency Department Course   Laboratory:  Laboratory findings were communicated with the patient who voiced understanding of the findings.    CBC: WBC 11.2 (H), HGB 12.9(L),   CMP: Glucose 102(H) o/w WNL (Creatinine 0.66)  Lipase: 31(L)      Procedures      Interventions:  1303 NS 1L IV Bolus   1304 Zofran 4mg, IV      Emergency Department Course:   Nursing notes and vitals reviewed.    1226 I performed an exam of the patient as documented above.     1311 IV was inserted and blood was drawn for laboratory testing, results above.     1410 I personally reviewed the results with the patient and answered all related questions prior to discharge.    Impression & Plan      Medical Decision Making:  Vijay Garg is a 28 year old male who presents to the emergency department today for evaluation of nausea and vomiting and body aches.  The clinical exam today is non-specific and non-focal and non-surgical.  The laboratory testing has returned normal.  Imaging is not indicated as there is no focal  abdominal pain or CP and had normal CXR at OSH.  The exact etiology of the nausea is not clear.  The differential diagnosis of nausea is large and includes:  Bowel Obstruction, Ulcer, Ischemia, Cholecystitis, Diverticulitis, Pancreatitis, UTI, Pyelonephritis, Enteritis/Colitis, amongst many other systems and etiologies.  The sequela of vomiting includes electrolyte abnormalities and dehydration.  Fluids were given and labs were normal.  The history, physical exam, and results detect no life threatening cause at this time, nor do they indicate the patient is currently suffering from one of the previously mentioned conditions.  [facility] mgmt has arranged a close followup.  Completley normal in neuro CV resp.  Slight elev WBC nonspecific with completely benign no red flag abd exam x2 in the ER.     Diagnosis:    ICD-10-CM    1. Nausea and vomiting, intractability of vomiting not specified, unspecified vomiting type R11.2      Disposition:   The patient is discharged to home.       Discharge Medications:  New Prescriptions    ONDANSETRON (ZOFRAN ODT) 8 MG ODT TAB    Take 1 tablet (8 mg) by mouth every 6 hours as needed       Scribe Disclosure:  Gris FIGUEROA, am serving as a scribe at 12:26 PM on 8/28/2019 to document services personally performed by Noam Ramos MD based on my observations and the provider's statements to me.  [facility] EMERGENCY DEPARTMENT       Noam Ramos MD  08/28/19 [id number]

## 2019-08-28 NOTE — DISCHARGE INSTRUCTIONS
An Emergency Dept follow up appointment has been made for you on 8/30/19  At 8:25 AM, if you would like to make any changes, please phone them at: [phone]

## 2019-08-28 NOTE — ED AVS SNAPSHOT
[facility] Emergency Department  [address]  [facility] [address]  Phone:  [phone]  Fax:  [phone]                                    Vijay Garg   MRN: [id number]    Department:  [facility] Emergency Department   Date of Visit:  8/28/2019           After Visit Summary Signature Page    I have received my discharge instructions, and my questions have been answered. I have discussed any challenges I see with this plan with the nurse or doctor.    ..........................................................................................................................................  Patient/Patient Representative Signature      ..........................................................................................................................................  Patient Representative Print Name and Relationship to Patient    ..................................................               ................................................  Date                                   Time    ..........................................................................................................................................  Reviewed by Signature/Title    ...................................................              ..............................................  Date                                               Time          22EPIC Rev 08/18

## 2019-08-28 NOTE — ED NOTES
"Pt. Resting comfortably with fiance at bedside. Had surgery on 15th of August, incisions on right palm and elbow appear healthy. Pt. Reports increased N&V since yesterday (27 August starting around 1200). States \" I am worried i'm dehydrated.\"   "

## 2019-08-28 NOTE — ED TRIAGE NOTES
Patient presents with complaints of body aches, vomiting, headaches, and SOB. Symptoms started on Friday. He was seen for these symptoms and blood work, tick les illness, and blood cultures. All blood work came back negative. He states  that he is having no improvement. He states that ABC intact without need for intervention at this time.

## 2019-08-28 NOTE — TELEPHONE ENCOUNTER
He is miserable, crying. Feels like he can't believe they can't figure out what is wrong with him.  He will go back in for further evaluation of current symptoms since he's not feeling any better on an antibiotic, Tylenol and Ibuprofen.  Anna Tobias RN-[facility]      Reason for Disposition    Sounds like a serious complication to the triager    Additional Information    Negative: Sounds like a life-threatening emergency to the triager    Negative: Discharged in past month from the hospital with a diagnosis of chronic obstructive pulmonic disease (COPD)    Negative: Discharged in past month from the hospital with a diagnosis of congestive heart failure (CHF) or heart failure (HF)    Negative: Discharged in past month from the hospital with a diagnosis of heart attack (myocardial infarction)    Negative: Discharged in past month from the hospital with a diagnosis of pneumonia    Negative: [1] Post-op AND [2] incision symptoms or questions    Negative: [1] Post-op AND [2] general symptoms or questions    Negative: Pain, redness, swelling, or pus at IV Site    Negative: IV not running or running slowly    Negative: Symptoms arising from use of a urinary catheter ([place] or [place])    Negative: Medication question    Negative: [1] New symptom AND [2] not a possible complication of hospitalized condition    Negative: Patient sounds very sick or weak to the triager    Protocols used: POST-HOSPITALIZATION FOLLOW-UP CALL-A-

## 2019-08-30 ENCOUNTER — OFFICE VISIT (OUTPATIENT)
Dept: PEDIATRICS | Facility: CLINIC | Age: 29
End: 2019-08-30
Payer: COMMERCIAL

## 2019-08-30 ENCOUNTER — HOSPITAL ENCOUNTER (OUTPATIENT)
Dept: CT IMAGING | Facility: CLINIC | Age: 29
Discharge: HOME | End: 2019-08-30
Attending: PHYSICIAN ASSISTANT | Admitting: PHYSICIAN ASSISTANT
Payer: COMMERCIAL

## 2019-08-30 VITALS
BODY MASS INDEX: 22.96 KG/M2 | HEART RATE: 88 BPM | HEIGHT: 71 IN | DIASTOLIC BLOOD PRESSURE: 62 MMHG | SYSTOLIC BLOOD PRESSURE: 124 MMHG | WEIGHT: 164 LBS | OXYGEN SATURATION: 93 % | TEMPERATURE: 97.6 F

## 2019-08-30 VITALS
RESPIRATION RATE: 16 BRPM | TEMPERATURE: 97.2 F | DIASTOLIC BLOOD PRESSURE: 66 MMHG | HEART RATE: 67 BPM | SYSTOLIC BLOOD PRESSURE: 119 MMHG | OXYGEN SATURATION: 90 %

## 2019-08-30 DIAGNOSIS — J18.9 PNEUMONIA OF BOTH LUNGS DUE TO INFECTIOUS ORGANISM, UNSPECIFIED PART OF LUNG: Primary | ICD-10-CM

## 2019-08-30 DIAGNOSIS — R04.2 HEMOPTYSIS: ICD-10-CM

## 2019-08-30 DIAGNOSIS — R79.81 LOW O2 SATURATION: ICD-10-CM

## 2019-08-30 DIAGNOSIS — R06.02 SHORTNESS OF BREATH: ICD-10-CM

## 2019-08-30 DIAGNOSIS — Z87.891 FORMER SMOKER: ICD-10-CM

## 2019-08-30 DIAGNOSIS — R31.9 HEMATURIA, UNSPECIFIED TYPE: ICD-10-CM

## 2019-08-30 DIAGNOSIS — R11.0 NAUSEA: ICD-10-CM

## 2019-08-30 DIAGNOSIS — R50.9 FEVER, UNSPECIFIED FEVER CAUSE: ICD-10-CM

## 2019-08-30 DIAGNOSIS — R50.9 FEVER, UNSPECIFIED FEVER CAUSE: Primary | ICD-10-CM

## 2019-08-30 PROBLEM — F32.A DEPRESSION: Status: ACTIVE | Noted: 2019-08-30

## 2019-08-30 LAB
ALBUMIN SERPL-MCNC: 2.7 G/DL (ref 3.4–5)
ALBUMIN UR-MCNC: 70 MG/DL
ALP SERPL-CCNC: 62 U/L (ref 40–150)
ALT SERPL W P-5'-P-CCNC: 20 U/L (ref 0–70)
ANION GAP SERPL CALCULATED.3IONS-SCNC: 6 MMOL/L (ref 3–14)
APPEARANCE UR: CLEAR
AST SERPL W P-5'-P-CCNC: 29 U/L (ref 0–45)
BASOPHILS # BLD AUTO: 0 10E9/L (ref 0–0.2)
BASOPHILS NFR BLD AUTO: 0.2 %
BILIRUB SERPL-MCNC: 0.5 MG/DL (ref 0.2–1.3)
BILIRUB UR QL STRIP: NEGATIVE
BUN SERPL-MCNC: 14 MG/DL (ref 7–30)
CALCIUM SERPL-MCNC: 8.8 MG/DL (ref 8.5–10.1)
CHLORIDE SERPL-SCNC: 103 MMOL/L (ref 94–109)
CO2 SERPL-SCNC: 26 MMOL/L (ref 20–32)
COLOR UR AUTO: YELLOW
CREAT SERPL-MCNC: 0.77 MG/DL (ref 0.66–1.25)
DIFFERENTIAL METHOD BLD: ABNORMAL
EOSINOPHIL # BLD AUTO: 0.1 10E9/L (ref 0–0.7)
EOSINOPHIL NFR BLD AUTO: 0.8 %
ERYTHROCYTE [DISTWIDTH] IN BLOOD BY AUTOMATED COUNT: 12.3 % (ref 10–15)
GFR SERPL CREATININE-BSD FRML MDRD: >90 ML/MIN/{1.73_M2}
GLUCOSE SERPL-MCNC: 109 MG/DL (ref 70–99)
GLUCOSE UR STRIP-MCNC: NEGATIVE MG/DL
HCT VFR BLD AUTO: 39.3 % (ref 40–53)
HGB BLD-MCNC: 12.8 G/DL (ref 13.3–17.7)
HGB UR QL STRIP: ABNORMAL
IMM GRANULOCYTES # BLD: 0.1 10E9/L (ref 0–0.4)
IMM GRANULOCYTES NFR BLD: 0.7 %
KETONES UR STRIP-MCNC: ABNORMAL MG/DL
LEUKOCYTE ESTERASE UR QL STRIP: NEGATIVE
LYMPHOCYTES # BLD AUTO: 0.8 10E9/L (ref 0.8–5.3)
LYMPHOCYTES NFR BLD AUTO: 6.5 %
MCH RBC QN AUTO: 28.7 PG (ref 26.5–33)
MCHC RBC AUTO-ENTMCNC: 32.6 G/DL (ref 31.5–36.5)
MCV RBC AUTO: 88 FL (ref 78–100)
MONOCYTES # BLD AUTO: 0.3 10E9/L (ref 0–1.3)
MONOCYTES NFR BLD AUTO: 2.5 %
MUCOUS THREADS #/AREA URNS LPF: PRESENT /LPF
NEUTROPHILS # BLD AUTO: 10.5 10E9/L (ref 1.6–8.3)
NEUTROPHILS NFR BLD AUTO: 89.3 %
NITRATE UR QL: NEGATIVE
NRBC # BLD AUTO: 0 10*3/UL
NRBC BLD AUTO-RTO: 0 /100
PH UR STRIP: 6.5 PH (ref 5–7)
PLATELET # BLD AUTO: 370 10E9/L (ref 150–450)
POTASSIUM SERPL-SCNC: 4 MMOL/L (ref 3.4–5.3)
PROT SERPL-MCNC: 8 G/DL (ref 6.8–8.8)
RBC # BLD AUTO: 4.46 10E12/L (ref 4.4–5.9)
RBC #/AREA URNS AUTO: 4 /HPF (ref 0–2)
SODIUM SERPL-SCNC: 135 MMOL/L (ref 133–144)
SOURCE: ABNORMAL
SP GR UR STRIP: 1.03 (ref 1–1.03)
TSH SERPL DL<=0.005 MIU/L-ACNC: 0.56 MU/L (ref 0.4–4)
UROBILINOGEN UR STRIP-MCNC: NORMAL MG/DL (ref 0–2)
WBC # BLD AUTO: 11.8 10E9/L (ref 4–11)
WBC #/AREA URNS AUTO: 3 /HPF (ref 0–5)

## 2019-08-30 PROCEDURE — 85025 COMPLETE CBC W/AUTO DIFF WBC: CPT | Performed by: PHYSICIAN ASSISTANT

## 2019-08-30 PROCEDURE — 94640 AIRWAY INHALATION TREATMENT: CPT | Performed by: PHYSICIAN ASSISTANT

## 2019-08-30 PROCEDURE — 84443 ASSAY THYROID STIM HORMONE: CPT | Performed by: PHYSICIAN ASSISTANT

## 2019-08-30 PROCEDURE — 25000128 H RX IP 250 OP 636: Performed by: PHYSICIAN ASSISTANT

## 2019-08-30 PROCEDURE — 80053 COMPREHEN METABOLIC PANEL: CPT | Performed by: PHYSICIAN ASSISTANT

## 2019-08-30 PROCEDURE — 36415 COLL VENOUS BLD VENIPUNCTURE: CPT | Performed by: PHYSICIAN ASSISTANT

## 2019-08-30 PROCEDURE — 99207 REFERRAL TO ACUTE AND DIAGNOSTIC SERVICES: CPT | Performed by: NURSE PRACTITIONER

## 2019-08-30 PROCEDURE — 71275 CT ANGIOGRAPHY CHEST: CPT

## 2019-08-30 PROCEDURE — 99215 OFFICE O/P EST HI 40 MIN: CPT | Mod: 25 | Performed by: PHYSICIAN ASSISTANT

## 2019-08-30 PROCEDURE — 81001 URINALYSIS AUTO W/SCOPE: CPT | Performed by: PHYSICIAN ASSISTANT

## 2019-08-30 PROCEDURE — 25000125 ZZHC RX 250: Performed by: PHYSICIAN ASSISTANT

## 2019-08-30 RX ORDER — AZITHROMYCIN 250 MG/1
TABLET, FILM COATED ORAL
Qty: 6 TABLET | Refills: 0 | Status: SHIPPED | OUTPATIENT
Start: 2019-08-30

## 2019-08-30 RX ORDER — IOPAMIDOL 755 MG/ML
500 INJECTION, SOLUTION INTRAVASCULAR ONCE
Status: COMPLETED | OUTPATIENT
Start: 2019-08-30 | End: 2019-08-30

## 2019-08-30 RX ORDER — AMOXICILLIN 875 MG
875 TABLET ORAL 2 TIMES DAILY
Qty: 20 TABLET | Refills: 0 | Status: SHIPPED | OUTPATIENT
Start: 2019-08-30 | End: 2019-09-09

## 2019-08-30 RX ORDER — ALBUTEROL SULFATE 90 UG/1
2 AEROSOL, METERED RESPIRATORY (INHALATION) EVERY 6 HOURS
Qty: 8.5 G | Refills: 0 | Status: SHIPPED | OUTPATIENT
Start: 2019-08-30

## 2019-08-30 RX ORDER — DOXYCYCLINE 100 MG/1
TABLET ORAL
Refills: 0 | COMMUNITY
Start: 2019-08-25 | End: 2019-08-30

## 2019-08-30 RX ORDER — PREDNISONE 20 MG/1
20 TABLET ORAL 2 TIMES DAILY
Qty: 10 TABLET | Refills: 0 | Status: SHIPPED | OUTPATIENT
Start: 2019-08-30 | End: 2019-09-04

## 2019-08-30 RX ORDER — ALBUTEROL SULFATE 0.83 MG/ML
2.5 SOLUTION RESPIRATORY (INHALATION) ONCE
Status: COMPLETED | OUTPATIENT
Start: 2019-08-30 | End: 2019-08-30

## 2019-08-30 RX ADMIN — ALBUTEROL SULFATE 2.5 MG: 0.83 SOLUTION RESPIRATORY (INHALATION) at 11:33

## 2019-08-30 RX ADMIN — IOPAMIDOL 63 ML: 755 INJECTION, SOLUTION INTRAVENOUS at 11:01

## 2019-08-30 RX ADMIN — SODIUM CHLORIDE 79 ML: 9 INJECTION, SOLUTION INTRAVENOUS at 11:01

## 2019-08-30 ASSESSMENT — MIFFLIN-ST. JEOR: SCORE: 1746.03

## 2019-08-30 NOTE — NURSING NOTE
The following nebulizer treatment was given:     MEDICATION: Albuterol Sulfate 2.5 mg  : Ritedose   LOT #: [id number]  EXPIRATION DATE:  Nov 2020  NDC # 00856-539-13     Nebulizer Start Time:  1126 am  Nebulizer Stop Time:  1142 am  See Vital Signs Flowsheet    Harlan Velázquez CMA ([facility])

## 2019-08-30 NOTE — NURSING NOTE
States he has had ibuprofen and acetaminophen this morning 0700.     Harlan Velázquez CMA ([facility])

## 2019-08-30 NOTE — Clinical Note
CT scan positive for pneumonia and 7 mm indeterminate noduleStop doxy due to stomach upsetStart zpak , amox, prednisone and albuterolAdvised to recheck with PCP in 2 wks, re-evaluate blood in urineRepeat CT scan in 12 months due to 7 mm noduleRecheck lyme test in 3 monthsIf symptoms worsen then go to the ED

## 2019-08-30 NOTE — PROGRESS NOTES
SUBJECTIVE:   Vijay Garg is a 28 year old male presenting with a chief complaint of having chest pains with breathing, coughing up mucous and blood, having fevers and struggling with nausea and vomiting.  Onset of symptoms was 6 day(s) ago.  Course of illness is waxing and waning.    Severity moderate  Current and Associated symptoms: fever, chills, chest pain with breathing, nausea and upset stomach, coughing up phlegm and blood  Treatment measures tried include was placed on doxycycline for viral illness vs lyme disease vs resp infection by the ED.  Predisposing factors include patient smokes and was recently on a road trip.    PMH  Tobacco abuse     Allergies   Allergen Reactions     Sulfa Drugs      Pt states when he was younger     Family Hx  HTN  Allergies      Social History     Tobacco Use     Smoking status: Former Smoker     Smokeless tobacco: Never Used     Tobacco comment: States he quit within the last few months-Used Vape pens   Substance Use Topics     Alcohol use: Yes       ROS:  CONSTITUTIONAL:POSITIVE  for fever and chills  INTEGUMENTARY/SKIN: NEGATIVE for worrisome rashes, moles or lesions  EYES: NEGATIVE for vision changes or irritation  ENT/MOUTH: POSITIVE for intermittent blood nose, left side  RESP:POSITIVE for chest congestion, coughing up blood and having pain with breathing  CV: NEGATIVE for chest pain, palpitations or peripheral edema  GI: POSITIVE for nausea and upset stomach since starting doxy  : POSTIIVE for hx of blood in urine  MUSCULOSKELETAL: POSITIVE for lung and chest wall pain with coughing  VASC: NEGATIVE for cool extremity  NEURO: NEGATIVE for weakness, dizziness or paresthesias    OBJECTIVE  :/66 (BP Location: Left arm, Patient Position: Chair, Cuff Size: Adult Regular)   Pulse 67   Temp 97.2  F (36.2  C) (Tympanic)   Resp 16   SpO2 90%   GENERAL APPEARANCE: healthy, alert and no distress  EYES: EOMI,  PERRL, conjunctiva clear  HENT: ear canals and TM's normal.   Nose and mouth without ulcers, erythema or lesions  NECK: supple, nontender, no lymphadenopathy  RESP: Positive for bronchospasms with coughing  CV: regular rates and rhythm, normal S1 S2, no murmur noted  ABDOMEN:  soft, nontender, no HSM or masses and bowel sounds normal  NEURO: Normal strength and tone, sensory exam grossly normal,  normal speech and mentation  SKIN: no suspicious lesions or rashes    Results for orders placed or performed in visit on 08/30/19   UA with Microscopic reflex to Culture   Result Value Ref Range    Color Urine Yellow     Appearance Urine Clear     Glucose Urine Negative NEG^Negative mg/dL    Bilirubin Urine Negative NEG^Negative    Ketones Urine Trace (A) NEG^Negative mg/dL    Specific Gravity Urine 1.028 1.003 - 1.035    Blood Urine Trace (A) NEG^Negative    pH Urine 6.5 5.0 - 7.0 pH    Protein Albumin Urine 70 (A) NEG^Negative mg/dL    Urobilinogen mg/dL Normal 0.0 - 2.0 mg/dL    Nitrite Urine Negative NEG^Negative    Leukocyte Esterase Urine Negative NEG^Negative    Source Unspecified Urine     WBC Urine 3 0 - 5 /HPF    RBC Urine 4 (H) 0 - 2 /HPF    Mucous Urine Present (A) NEG^Negative /LPF   CBC with platelets differential   Result Value Ref Range    WBC 11.8 (H) 4.0 - 11.0 10e9/L    RBC Count 4.46 4.4 - 5.9 10e12/L    Hemoglobin 12.8 (L) 13.3 - 17.7 g/dL    Hematocrit 39.3 (L) 40.0 - 53.0 %    MCV 88 78 - 100 fl    MCH 28.7 26.5 - 33.0 pg    MCHC 32.6 31.5 - 36.5 g/dL    RDW 12.3 10.0 - 15.0 %    Platelet Count 370 150 - 450 10e9/L    Diff Method Automated Method     % Neutrophils 89.3 %    % Lymphocytes 6.5 %    % Monocytes 2.5 %    % Eosinophils 0.8 %    % Basophils 0.2 %    % Immature Granulocytes 0.7 %    Nucleated RBCs 0 0 /100    Absolute Neutrophil 10.5 (H) 1.6 - 8.3 10e9/L    Absolute Lymphocytes 0.8 0.8 - 5.3 10e9/L    Absolute Monocytes 0.3 0.0 - 1.3 10e9/L    Absolute Eosinophils 0.1 0.0 - 0.7 10e9/L    Absolute Basophils 0.0 0.0 - 0.2 10e9/L    Abs Immature  Granulocytes 0.1 0 - 0.4 10e9/L    Absolute Nucleated RBC 0.0    Comprehensive metabolic panel   Result Value Ref Range    Sodium 135 133 - 144 mmol/L    Potassium 4.0 3.4 - 5.3 mmol/L    Chloride 103 94 - 109 mmol/L    Carbon Dioxide 26 20 - 32 mmol/L    Anion Gap 6 3 - 14 mmol/L    Glucose 109 (H) 70 - 99 mg/dL    Urea Nitrogen 14 7 - 30 mg/dL    Creatinine 0.77 0.66 - 1.25 mg/dL    GFR Estimate >90 >60 mL/min/[1.73_m2]    GFR Estimate If Black >90 >60 mL/min/[1.73_m2]    Calcium 8.8 8.5 - 10.1 mg/dL    Bilirubin Total 0.5 0.2 - 1.3 mg/dL    Albumin 2.7 (L) 3.4 - 5.0 g/dL    Protein Total 8.0 6.8 - 8.8 g/dL    Alkaline Phosphatase 62 40 - 150 U/L    ALT 20 0 - 70 U/L    AST 29 0 - 45 U/L   TSH with free T4 reflex   Result Value Ref Range    TSH 0.56 0.40 - 4.00 mU/L     CT CHEST PULMONARY EMBOLISM WITH CONTRAST  8/30/2019 11:08 AM      HISTORY: Shortness of breath; PE suspected, low pretest probability.  Hemoptysis. Low O2 saturation.      TECHNIQUE: Thin section axial images are performed from the thoracic  inlet to the lung bases utilizing 63 mL of Isovue 370 IV contrast  without adverse event. Coronal reformatted images are also generated.  Radiation dose for this scan was reduced using automated exposure  control, adjustment of the mA and/or kV according to patient size, or  iterative reconstruction technique.     FINDINGS:  Diffuse bilateral airspace opacities are present consistent  with pneumonia. Indeterminate lateral left lower lobe lung nodule  measures 0.7 cm on series 9, image 182. A smaller nodule left lower  lobe on image 184 measures 0.3 cm. A left costophrenic angle nodule  measures 0.6 cm on image 231. Airways appear patent. No pleural or  pericardial fluid. Heart is normal in size. Esophagus is unremarkable.  Thyroid gland appears normal in size. No enlarged axillary lymph  nodes. An enlarged subcarinal node measures 1.7 x 1.1 cm on series 5,  image 66. Right hilar lymph node conglomeration  measures 2.6 x 1.7 cm  on image 55. Multiple small left hilar lymph nodes are also noted.  These are likely reactive in nature. No evidence of pulmonary  embolism. Thoracic aorta is unremarkable. No aneurysm or dissection.  Limited images upper abdomen are within normal limits. Bone window  examination is unremarkable.                                                                      IMPRESSION:  1. Diffuse bilateral airspace opacities are present concerning for  pneumonia. Reactive mediastinal and hilar lymph nodes are present.  2. Indeterminate left lower lobe lung nodules measuring up to 0.7 cm.     Recommendations for an incidental lung nodule = or > 6mm to 8mm:    Low risk patients: Initial follow-up CT at 6-12 months, then  consider CT at 18-24 months if no change.    High risk patients: Initial follow-up CT at 6-12 months, then CT at  18-24 months if no change.    ASSESSMENT/PLAN:      ICD-10-CM    1. Pneumonia of both lungs due to infectious organism, unspecified part of lung J18.9 azithromycin (ZITHROMAX) 250 MG tablet     amoxicillin (AMOXIL) 875 MG tablet     albuterol (PROVENTIL HFA) 108 (90 Base) MCG/ACT inhaler     predniSONE (DELTASONE) 20 MG tablet   2. Fever, unspecified fever cause R50.9 Referral to Acute and Diagnostic Services (Day of diagnostic / First order acute)     CBC with platelets differential     CANCELED: CBC with platelets differential   3. Shortness of breath R06.02 IV access     sodium chloride (PF) 0.9% PF flush 3 mL     Referral to Acute and Diagnostic Services (Day of diagnostic / First order acute)     CBC with platelets differential     Comprehensive metabolic panel     TSH with free T4 reflex     CT Chest Pulmonary Embolism w Contrast     INHALATION/NEBULIZER TREATMENT, INITIAL     albuterol (PROVENTIL) neb solution 2.5 mg     albuterol (PROVENTIL HFA) 108 (90 Base) MCG/ACT inhaler     predniSONE (DELTASONE) 20 MG tablet                       4. Hemoptysis R04.2 IV access      sodium chloride (PF) 0.9% PF flush 3 mL     Referral to Acute and Diagnostic Services (Day of diagnostic / First order acute)     CBC with platelets differential     Comprehensive metabolic panel     CT Chest Pulmonary Embolism w Contrast     IV access                  5. Former smoker Z87.891 Referral to Acute and Diagnostic Services (Day of diagnostic / First order acute)     IV access   6. Low O2 saturation R79.81 Referral to Acute and Diagnostic Services (Day of diagnostic / First order acute)     CBC with platelets differential     CT Chest Pulmonary Embolism w Contrast        7. Hematuria, unspecified type R31.9 Referral to Acute and Diagnostic Services (Day of diagnostic / First order acute)     UA with Microscopic reflex to Culture       Orders Placed This Encounter     INHALATION/NEBULIZER TREATMENT, INITIAL     CT Chest Pulmonary Embolism w Contrast     UA with Microscopic reflex to Culture     CBC with platelets differential     Comprehensive metabolic panel     TSH with free T4 reflex     sodium chloride (PF) 0.9% PF flush 3 mL     DISCONTD: doxycycline monohydrate (ADOXA) 100 MG tablet     albuterol (PROVENTIL) neb solution 2.5 mg     azithromycin (ZITHROMAX) 250 MG tablet     amoxicillin (AMOXIL) 875 MG tablet     albuterol (PROVENTIL HFA) 108 (90 Base) MCG/ACT inhaler     predniSONE (DELTASONE) 20 MG tablet       CBC elevated due to pneumonia  UA still has blood in urine  CMP to evaluate for electrolyte  TSH normal  CT scan positive for pneumonia and 7 mm indeterminate nodule  Stop doxy due to stomach upset  Start zpak , amox, prednisone and albuterol  Advised to recheck with PCP in 2 wks, re-evaluate blood in urine  Repeat CT scan in 12 months due to 7 mm nodule  Recheck lyme test in 3 months  If symptoms worsen then go to the ED

## 2019-08-30 NOTE — PATIENT INSTRUCTIONS
Patient Education     Pneumonia (Adult)  Pneumonia is an infection deep within the lungs. It is in the small air sacs (alveoli). Pneumonia may be caused by a virus or bacteria. Pneumonia caused by bacteria is usually treated with an antibiotic. Severe cases may need to be treated in the hospital. Milder cases can be treated at home. Symptoms usually start to get better during the first 2 days of treatment.    Home care  Follow these guidelines when caring for yourself at home:    Rest at home for the first 2 to 3 days, or until you feel stronger. Don t let yourself get overly tired when you go back to your activities.    Stay away from cigarette smoke - yours or other people s.    You may use acetaminophen or ibuprofen to control fever or pain, unless another medicine was prescribed. If you have chronic liver or kidney disease, talk with your healthcare provider before using these medicines. Also talk with your provider if you ve had a stomach ulcer or gastrointestinal bleeding. Don t give aspirin to anyone younger than 18 years of age who is ill with a fever. It may cause severe liver damage.    Your appetite may be poor, so a light diet is fine.    Drink 6 to 8 glasses of fluids every day to make sure you are getting enough fluids. Beverages can include water, sport drinks, sodas without caffeine, juices, tea, or soup. Fluids will help loosen secretions in the lung. This will make it easier for you to cough up the phlegm (sputum). If you also have heart or kidney disease, check with your healthcare provider before you drink extra fluids.    Take antibiotic medicine prescribed until it is all gone, even if you are feeling better after a few days.  Follow-up care  Follow up with your healthcare provider in the next 2 to 3 days, or as advised. This is to be sure the medicine is helping you get better.  If you are 65 or older, you should get a pneumococcal vaccine and a yearly flu (influenza) shot. You should also  "get these vaccines if you have chronic lung disease like asthma, emphysema, or COPD. Recently, a second type of pneumonia vaccine has become available for everyone over 65 years old. This is in addition to the previous vaccine. Ask your provider about this.  When to seek medical advice  Call your healthcare provider right away if any of these occur:    You don t get better within the first 48 hours of treatment    Shortness of breath gets worse    Rapid breathing (more than 25 breaths per minute)    Coughing up blood    Chest pain gets worse with breathing    Fever of 100.4 F (38 C) or higher that doesn t get better with fever medicine    Weakness, dizziness, or fainting that gets worse    Thirst or dry mouth that gets worse    Sinus pain, headache, or a stiff neck    Chest pain not caused by coughing  Date Last Reviewed: 1/1/2017 2000-2018 [employer]. [address] [address]. All rights reserved. This information is not intended as a substitute for professional medical care. Always follow your healthcare professional's instructions.           Patient Education     Help for Smokers  What Are Your Reasons for Quitting?  Should I think about quitting smoking?  When it comes to lasting life change, readiness is everything. This may be the perfect time to quit using tobacco. If you have concerns about your health, this is your chance to reflect on your habits and make healthy changes.  Perhaps you have tried quitting in the past. It may help to think of quitting as a process that you take one day at a time. Every attempt brings you closer to success. And each time you try, you learn more about what works for you.  What are my reasons for quitting?  The first step in quitting is to decide why quitting is important to you. Here is one good reason:  \"Scientists have identified more than 7,000 chemicals and chemical compounds in tobacco smoke. At least 70 of them are known specifically to " "cause cancer.\"  --2014 Surgeon General Report  Consider health risks:    Smoking is the leading cause of heart disease, lung disease and stroke.    For the first time, women are as likely to die as men from many diseases caused by smoking.    Smoking can harm a person's health before birth and throughout his or her life.    Smoking causes cancer of the lungs, kidney, stomach, pancreas, cervix and bone marrow.    Smoking leads to impotence (loss of sexual function).    Smoking causes cataracts (clouding of the lens in the eye).    Smoking can cause you to lose teeth.    Smoking can lead to osteoporosis (brittle bone disease). This means a greater risk of broken bones. And if you smoke, your bones will heal more slowly.    Smoking leads to more infections. If you are healing from surgery, your incision has a greater chance of getting infected, which delays healing.    Smoking leads to more trips to the hospital--and longer stays.    Smokers are 30 to 40 percent more likely to develop type 2 diabetes than non-smokers.    About 3 out of 4 teen smokers become adult smokers, even if they plan to quit in a few years.    Secondhand smoke exposure is now known to cause strokes in nonsmokers.    More than 33,000 nonsmokers die every year in the [place] from coronary artery disease caused by exposure to secondhand smoke.  Consider personal reasons:    Smoking costs too much money.    My clothes stink.    I miss out on activities with my family because I am away smoking.    My breathing test showed I have the beginnings of emphysema.    A loved one  of cancer.    Smoking seems to control my life.  What other reasons do you have for quitting?  __________________________________________  __________________________________________  Does the way I talk to myself affect my ability to quit smoking?  If you tell yourself you can't quit, it will be very hard to quit. If you tell yourself you are a non-smoker, you will live up " "to that name.  Quitting happens one day at a time. When you feel a strong urge to smoke, think about your larger goal: to have a free and healthy life.  If I change my behaviors, am I more likely to succeed?  To go to the Super Bowl, a football team must have an action plan. Team members must prepare for the garcia to win. They have to have more than one play. And if they don't make it to the big game, they don't give up--they simply make a new plan for next time.  Like getting to the Super Bowl, quitting smoking calls for more than just willpower. Nicotine is a powerful, addictive drug. Withdrawal symptoms are much like those from cocaine or heroin. To fight them, you need many \"plays\" throughout the day.  Most people do not succeed the first time they try to quit smoking. It may take several tries, and you may need several action plans.  Start your day with a plan. Change how and where you do things. If you always smoke in a certain chair in the living room, don't go there. If you always have a cigarette with a drink, avoid drinking for a while. Other steps you might take:    Instead of smoking in the morning, brush your teeth when you first get up, then eat a healthy breakfast.    Instead of smoking in the car, stash some low-fat treats in the glove box. Or buy special music for the ride.    Instead of smoking after meals, clear the table and go for a walk.    Instead of smoking when you're under stress, do some deep-breathing exercises.  When you wake up the next day, start your action plan again. See yourself as a winner. Being free of nicotine puts you in control of your body and health. Don't take your eyes off that goal, even when the going gets rough.  What can I do about nicotine withdrawal?  Nicotine addiction is not about willpower or strength of character. It's about brain chemistry. Nicotine provides a \"kick,\" causing the brain to release chemicals into the body. These chemicals give you a feeling of " pleasure. Depending on the dose, they can also make you feel calm. People smoke to keep high levels of these chemicals in the body. The pleasure they feel makes them want to keep smoking.  In the first 48 hours after quitting, nicotine withdrawal can be intense. You may feel irritable and have strong cigarette cravings. You might have a hard time going to sleep or concentrating while awake. You may want to eat more than usual.  The good news is, these symptoms peak within a few days. They should subside within a few weeks.  Do smoking aids help?  Yes. Using a smoking aid--such as a nicotine patch or a medicine like Chantix--can increase your chances for success. Ask your doctor which aid is best for you:    Nicotine patches    Nicotine gum or lozenges    Nicotine nasal spray or a nicotine inhaler (a plastic filter that you puff almost like a cigarette), prescribed by a doctor    Varenicline (Chantix) or bupropion (Zyban, Wellbutrin), prescribed by a doctor  Nicotine replacement helps with the physical addiction. If you combine this with a quit-smoking program, you can double your chances for success.  What is my action plan?    Before you quit, make two lists: reasons you want to quit and reasons you don't. When your first list is twice as long as the second, you will be more likely to succeed.    Pick a quit date. Perhaps it was the day you came to the hospital or clinic.    Tell friends and family.    Stock up on carrots, sugar-free mints, cinnamon sticks and other items to keep your mouth busy.    Keep your hands busy with drawing, knitting, playing an instrument or other hobbies.    Find support. Will you use nicotine replacement? Attend a class? Join [employer]? Talk with friends who have quit?    Get rid of all cigarettes, lighters, ashtrays and other smoking items.    Keep active. Exercise will release pleasure chemicals in your brain, just like smoking did. Try walking, biking, gardening and other  activities.    Drink lots of water.    Reduce or avoid alcohol.    Breathe deeply. When you smoked, you breathed the smoke deep into your lungs. Now picture your lungs filling with fresh, clean air.    Reward yourself with the money you saved by quitting. Go to a movie, take a vacation, buy a car.  Today, there are more former smokers than current smokers. Each year, over half of all daily smokers try to quit.  How quickly will my health improve?  Your body has an amazing way of healing itself over time, if you let it. Here are just a few of the long-term rewards of quitting. But don't forget--all these benefits will end if you light another cigarette.  The moment you stop smoking  The air around you is no longer harmful to children or other adults.  After 20 minutes  Your blood pressure and heart rate drop to normal.  Your hands and feet warm to a normal temperature.  After 8 hours  The carbon monoxide level in your blood drops to normal.  The oxygen level in your blood rises to normal.  After 24 hours  Your chance of heart attack is lower.  After 48 hours  Your sense of smell and taste improve.  After 2 weeks to 3 months  Your blood flow improves.  It's easier for you to walk.  Your lungs work up to 30 percent better than before.  You catch colds and the flu less often.  After 1 to 9 months  You have less coughing and shortness of breath.  You have fewer sinus problems.  You have more energy.  You feel better about yourself because you've done what you set out to do.  After 1 year  You cut your risk of heart disease in half.  You lower your risk of cancer and lung disease.  You have fewer sick days and health problems.  You reduce the cost of your auto, home and life insurance.  After 10 years  Normal cells replace your pre-cancer cells.  You greatly lower your risk of mouth, larynx, lung and bladder cancer.  Your risk of artery disease is the same as if you had never smoked.  You--and anyone who was exposed to  your second-hand smoke--will have a longer life expectancy.  For more information  QUITPLAN (4-311-277-PLAN), the [place] Tobacco Quit Line, provides free professional counseling over the phone.  www.quitplan.com  For informational purposes only. Not to replace the advice of your health care provider.  Copyright   2004 [employer]. All rights reserved. Clinically reviewed by Salena Villatoro. [facility] [id number] - REV 08/18.  For informational purposes only. Not to replace the advice of your health care provider.  Copyright   2018 [employer]. All rights reserved.

## 2019-08-30 NOTE — PATIENT INSTRUCTIONS
We are transitioning you to the [facility] for further treatment.  This clinic is located at [address], [address] in [place] [id number].  [facility] Building

## 2019-08-30 NOTE — PROGRESS NOTES
"Subjective     Vijay Garg is a 28 year old male who presents to clinic today for the following health issues:    HPI   ED/UC Followup:    Facility:  [facility]  Date of visit: 08/28/2019  Reason for visit: Nausea / Vomiting, Dehydration, Headache, SOB, Body aches.  Current Status: felt good last night, got worse over the night. Coughing up blood now and left nostril is full of blood.     [facility] 08/25/19:  CBC: WBC 10.7(H), HGB 14.6,   UA with micro: trace blood in urine, moderate mucous,  o/w negative   CMP: Glucose 108.3(H), CRP 12.25(H), o/w WNL (Creatinine: 1.09)  Lyme's: negative  Blood cultures: negative, x2  Strep: negative     Chest XR PA&LAT 8/25/19:  No acute disease.     Seen at [facility] on 8/25 for fever/chills-temp was 102F before going to the hospital.   Given Doxycycline at the hospital, still on this on day 5 of antibiotics (10 days total).    Hurts to lungs all over to take a deep breath, short of breath (all the time), now coughing up moderate amount of blood tinged mucus since yesterday.    No swelling in ankles or palpitations.  Continues to feel nauseated but this has improved with Zofran-taking Zofran twice per day. Able to tolerate fluids and able to tolerate solid foods-mild diet.  Vomited x1 since ED [facility] visit, no vomit in blood.  No abdominal pain.   Continues to have some \"pressure in his head\" all over-Tylenol and Ibuprofen helpful, notices headaches with fever.  Has been taking Tylenol and Ibuprofen regularly since ED visit; has not been checking fever but continues to have intermittent chills/sweats.  Has lost 5 pounds in the past week.  Carpal tunnel surgery 8/15 on the right side- was a bit more immobile with this.    Reviewed and updated as needed this visit by Provider  Meds  Problems       Review of Systems   ROS COMP: Constitutional, HEENT, cardiovascular, pulmonary, GI, , musculoskeletal, neuro, skin, endocrine and psych systems " "are negative, except as otherwise noted.      Objective    /62 (BP Location: Right arm, Patient Position: Chair, Cuff Size: Adult Regular)   Pulse 88   Temp 97.6  F (36.4  C) (Tympanic)   Ht 1.803 m (5' 11\")   Wt 74.4 kg (164 lb)   SpO2 93%   BMI 22.87 kg/m    Body mass index is 22.87 kg/m .  Physical Exam   GENERAL: healthy, alert and no distress  EYES: Eyes grossly normal to inspection, PERRL and conjunctivae and sclerae normal  HENT: ear canals and TM's normal, nose and mouth without ulcers or lesions, OP slightly red with clear PND  NECK: no adenopathy  RESP: lungs clear to auscultation - no rales, rhonchi or wheezes  CV: regular rate and rhythm, normal S1 S2, no S3 or S4, no murmur, click or rub, no peripheral edema and peripheral pulses strong  ABDOMEN: soft, nontender, no hepatosplenomegaly, no masses and bowel sounds normal  SKIN: no suspicious lesions or rashes  NEURO: Normal strength and tone, mentation intact and speech normal    Diagnostic Test Results:  Labs reviewed in Epic        Assessment & Plan     ICD-10-CM    1. Fever, unspecified fever cause R50.9 Referral to Acute and Diagnostic Services (Day of diagnostic / First order acute)   2. Shortness of breath R06.02 Referral to Acute and Diagnostic Services (Day of diagnostic / First order acute)   3. Hemoptysis R04.2 Referral to Acute and Diagnostic Services (Day of diagnostic / First order acute)   4. Former smoker Z87.891 Referral to Acute and Diagnostic Services (Day of diagnostic / First order acute)   5. Low O2 saturation R79.81 Referral to Acute and Diagnostic Services (Day of diagnostic / First order acute)   6. Hematuria, unspecified type R31.9 Referral to Acute and Diagnostic Services (Day of diagnostic / First order acute)     CANCELED: UA with Microscopic reflex to Culture   7. Nausea R11.0      Patient presents today after x2 ED visits for fever/chills, nausea, shortness of breath, headache with essentially normal work-up other " than slightly elevated WBC, CRP, and hematuria. Has been taking Doxycycline for the past 6 days for possible lyme's disease vs resp infection. Since last ED visit he has since developed chest pain, worst on inspiration, and hemoptysis in addition to previous symptoms.    Due to concern for possible PE (risks include hemoptysis, low O2 sats, chest pain, travel, recent surgery), patient was sent to Acute and Diagnostic Services for further work-up including repeat labs, urine, and CT as a starting point. Other can't miss diagnosis to consider if symptoms persist/do not improve are Wegener's disease dt hematuria, elevated WBC, elevated inflam markers.     Patient Instructions   We are transitioning you to the [facility] for further treatment.  This clinic is located at [address], [address] in [place] [address] Building            No follow-ups on file.    Rufina Tamez NP  [facility]

## 2019-09-01 ENCOUNTER — HOSPITAL ENCOUNTER (EMERGENCY)
Facility: CLINIC | Age: 29
Discharge: HOME | End: 2019-09-01
Attending: EMERGENCY MEDICINE | Admitting: EMERGENCY MEDICINE
Payer: COMMERCIAL

## 2019-09-01 VITALS
SYSTOLIC BLOOD PRESSURE: 124 MMHG | OXYGEN SATURATION: 94 % | TEMPERATURE: 98.4 F | RESPIRATION RATE: 18 BRPM | DIASTOLIC BLOOD PRESSURE: 80 MMHG | HEART RATE: 77 BPM

## 2019-09-01 DIAGNOSIS — K52.1 ANTIBIOTIC-ASSOCIATED DIARRHEA: ICD-10-CM

## 2019-09-01 DIAGNOSIS — T36.95XA ANTIBIOTIC-ASSOCIATED DIARRHEA: ICD-10-CM

## 2019-09-01 LAB
ALBUMIN SERPL-MCNC: 2.8 G/DL (ref 3.4–5)
ALP SERPL-CCNC: 53 U/L (ref 40–150)
ALT SERPL W P-5'-P-CCNC: 45 U/L (ref 0–70)
ANION GAP SERPL CALCULATED.3IONS-SCNC: 5 MMOL/L (ref 3–14)
AST SERPL W P-5'-P-CCNC: 32 U/L (ref 0–45)
BASOPHILS # BLD AUTO: 0 10E9/L (ref 0–0.2)
BASOPHILS NFR BLD AUTO: 0.1 %
BILIRUB SERPL-MCNC: 0.2 MG/DL (ref 0.2–1.3)
BUN SERPL-MCNC: 19 MG/DL (ref 7–30)
CALCIUM SERPL-MCNC: 8.9 MG/DL (ref 8.5–10.1)
CHLORIDE SERPL-SCNC: 103 MMOL/L (ref 94–109)
CO2 SERPL-SCNC: 29 MMOL/L (ref 20–32)
CREAT SERPL-MCNC: 0.71 MG/DL (ref 0.66–1.25)
DIFFERENTIAL METHOD BLD: ABNORMAL
EOSINOPHIL # BLD AUTO: 0.2 10E9/L (ref 0–0.7)
EOSINOPHIL NFR BLD AUTO: 2.2 %
ERYTHROCYTE [DISTWIDTH] IN BLOOD BY AUTOMATED COUNT: 12.4 % (ref 10–15)
GFR SERPL CREATININE-BSD FRML MDRD: >90 ML/MIN/{1.73_M2}
GLUCOSE SERPL-MCNC: 104 MG/DL (ref 70–99)
HCT VFR BLD AUTO: 38.7 % (ref 40–53)
HGB BLD-MCNC: 12.3 G/DL (ref 13.3–17.7)
IMM GRANULOCYTES # BLD: 0.1 10E9/L (ref 0–0.4)
IMM GRANULOCYTES NFR BLD: 1.1 %
LYMPHOCYTES # BLD AUTO: 1.5 10E9/L (ref 0.8–5.3)
LYMPHOCYTES NFR BLD AUTO: 17.5 %
MCH RBC QN AUTO: 28.3 PG (ref 26.5–33)
MCHC RBC AUTO-ENTMCNC: 31.8 G/DL (ref 31.5–36.5)
MCV RBC AUTO: 89 FL (ref 78–100)
MONOCYTES # BLD AUTO: 0.3 10E9/L (ref 0–1.3)
MONOCYTES NFR BLD AUTO: 3.1 %
NEUTROPHILS # BLD AUTO: 6.7 10E9/L (ref 1.6–8.3)
NEUTROPHILS NFR BLD AUTO: 76 %
NRBC # BLD AUTO: 0 10*3/UL
NRBC BLD AUTO-RTO: 0 /100
PLATELET # BLD AUTO: 539 10E9/L (ref 150–450)
POTASSIUM SERPL-SCNC: 3.6 MMOL/L (ref 3.4–5.3)
PROT SERPL-MCNC: 7.6 G/DL (ref 6.8–8.8)
RBC # BLD AUTO: 4.34 10E12/L (ref 4.4–5.9)
SODIUM SERPL-SCNC: 137 MMOL/L (ref 133–144)
WBC # BLD AUTO: 8.8 10E9/L (ref 4–11)

## 2019-09-01 PROCEDURE — 80053 COMPREHEN METABOLIC PANEL: CPT | Performed by: EMERGENCY MEDICINE

## 2019-09-01 PROCEDURE — 99283 EMERGENCY DEPT VISIT LOW MDM: CPT

## 2019-09-01 PROCEDURE — 85025 COMPLETE CBC W/AUTO DIFF WBC: CPT | Performed by: EMERGENCY MEDICINE

## 2019-09-01 ASSESSMENT — ENCOUNTER SYMPTOMS
ACTIVITY CHANGE: 0
COUGH: 0
NAUSEA: 0
CHILLS: 0
WOUND: 0
APPETITE CHANGE: 0
DIARRHEA: 1
BLOOD IN STOOL: 1
SHORTNESS OF BREATH: 0
ABDOMINAL PAIN: 0
FEVER: 0
VOMITING: 0
BRUISES/BLEEDS EASILY: 0

## 2019-09-01 NOTE — ED AVS SNAPSHOT
[facility] Emergency Department  [address]  [facility] [address]  Phone:  [phone]  Fax:  [phone]                                    Vijay Garg   MRN: [id number]    Department:  [facility] Emergency Department   Date of Visit:  9/1/2019           After Visit Summary Signature Page    I have received my discharge instructions, and my questions have been answered. I have discussed any challenges I see with this plan with the nurse or doctor.    ..........................................................................................................................................  Patient/Patient Representative Signature      ..........................................................................................................................................  Patient Representative Print Name and Relationship to Patient    ..................................................               ................................................  Date                                   Time    ..........................................................................................................................................  Reviewed by Signature/Title    ...................................................              ..............................................  Date                                               Time          22EPIC Rev 08/18

## 2019-09-02 NOTE — ED TRIAGE NOTES
Here for wateray diarrhea with red colored stool, concern for rectal bleed. Currently taking azithromycin and amoxicillin. Recently diagnose with pneumonia on 8/30/19. ABCs intact.

## 2019-09-02 NOTE — DISCHARGE INSTRUCTIONS
Continue taking your antibiotics as prescribed until they are completely gone.  Probiotics can help restore the normal good intestinal bacteria that help regulate your bowel movements and help you to digest your food.  Antibiotics can kill these normal healthy bacteria and lead to antibiotic associated diarrhea.    Discharge Instructions  Adult Diarrhea    You have been seen today for diarrhea (loose stools). This is usually caused by a virus, but some bacteria, parasites, medicines, or other medical conditions can cause similar symptoms. At this time your provider does not find that your diarrhea is a sign of anything dangerous or life-threatening. However, sometimes the signs of serious illness do not show up right away. If you have new or worse symptoms, you may need to be seen again in the Emergency Department or by your primary provider.     Generally, every Emergency Department visit should have a follow-up clinic visit with either a primary or a specialty clinic/provider. Please follow-up as instructed by your emergency provider today.    Return to the Emergency Department if:  You feel you are getting dehydrated, such as being very thirsty, not urinating (peeing) like usual, or feeling faint or lightheaded.   You develop a new fever.  You have abdominal (belly) pain that seems worse than cramps, is in one spot, or is getting worse over time.   You have blood in your stool or your stool becomes black.  (Remember that if you take Pepto-Bismol , this will turn your stool black).   You feel very weak.    What can I do to help myself?  The most important thing to do is to drink clear liquids.   It is best to have only small, frequent sips of liquids. Drinking too much at once may cause more diarrhea. You should also replace minerals, sodium and potassium lost with diarrhea. Pedialyte  and sports drinks can help you replace these minerals. You can also drink clear liquids such as water, weak tea, apple juice,  "and 7-Up . Avoid acidic liquids (orange juice), caffeine (coffee) or alcohol. Milk products will make the diarrhea worse.  Eat bland (plain) foods. Soda crackers, toast, plain noodles, gelatin, applesauce and bananas are good first choices. Avoid foods that have acid, are spicy, fatty or fibrous (such as meats, coarse grains, vegetables). You may start eating these foods again in about 3 days when you are better.   Sometimes treatment includes prescription medicine to prevent diarrhea. If your provider prescribes these for you, take them as directed.   Nonprescription medicine is available for the treatment of diarrhea and can be very effective. If you use it, make sure you use the dose recommended on the package. Check with your healthcare provider before you use any medicine for diarrhea.   Do not take ibuprofen, or other nonsteroidal anti-inflammatory medicines, without checking with your healthcare provider.   Probiotics: If you have been given an antibiotic, you may want to also take a probiotic pill or eat yogurt with live cultures. Probiotics have \"good bacteria\" to help your intestines stay healthy. Studies have shown that probiotics help prevent diarrhea and other intestine problems (including C. diff infection) when you take antibiotics. You can buy these without a prescription in the pharmacy section of the store.   If you were given a prescription for medicine here today, be sure to read all of the information (including the package insert) that comes with your prescription.  This will include important information about the medicine, its side effects, and any warnings that you need to know about.  The pharmacist who fills the prescription can provide more information and answer questions you may have about the medicine.  If you have questions or concerns that the pharmacist cannot address, please call or return to the Emergency Department.  Remember that you can always come back to the Emergency " Department if you are not able to see your regular provider in the amount of time listed above, if you get any new symptoms, or if there is anything that worries you.

## 2019-09-02 NOTE — ED PROVIDER NOTES
History     Chief Complaint:  Diarrhea      HPI   Vijay Garg is a 28 year old male recently treated for multifocal bilateral pneumonia started on amoxicillin and azithromycin, prednisone, and albuterol 3 days ago and reports significant improvement in his symptoms of shortness of breath cough and malaise.  He reports he is been eating and drinking well has had no persistent vomiting but does note that since starting the antibiotics he has developed watery diarrhea which is red-tinged.  He denies any valeria blood or melena.  He denies any abdominal pain.  No lightheadedness or dizziness.  He denies any bleeding anywhere else.  Prior to starting the amoxicillin and azithromycin he was on doxycycline.  He denies any new rash.  He denies any rectal pain or swelling.  Otherwise reports that he is generally significantly improving.  No recurrent fevers.    Allergies:  Allergies   Allergen Reactions     Sulfa Drugs      Pt states when he was younger        Medications:      albuterol (PROVENTIL HFA) 108 (90 Base) MCG/ACT inhaler   amoxicillin (AMOXIL) 875 MG tablet   azithromycin (ZITHROMAX) 250 MG tablet   predniSONE (DELTASONE) 20 MG tablet   Probiotic Product (VISBIOME PROBIOTIC HIGH POT) CAPS       Past Medical History:    Bilateral pneumonia   carpal tunnel syndrome      Past Surgical History:    Carpal tunnel release, cubital tunnel release      Family History:    Family History   Problem Relation Age of Onset     Diabetes Father      Heart Disease Father         Mitral Valve Replacement     Depression Son        Social History:  Marital Status:  Single [1]  Social History     Tobacco Use     Smoking status: Former Smoker     Smokeless tobacco: Never Used     Tobacco comment: States he quit within the last few months-Used Vape pens   Substance Use Topics     Alcohol use: Yes     Drug use: Yes     Types: Marijuana        Review of Systems   Constitutional: Negative for activity change, appetite change, chills and  fever.   Respiratory: Negative for cough and shortness of breath.    Cardiovascular: Negative for chest pain.   Gastrointestinal: Positive for blood in stool and diarrhea. Negative for abdominal pain, nausea and vomiting.   Skin: Negative for rash and wound.   Hematological: Does not bruise/bleed easily.   All other systems reviewed and are negative.        Physical Exam   First Vitals:  BP: 131/79  Pulse: 90  Heart Rate: 90  Temp: 98.4  F (36.9  C)  Resp: 18  SpO2: 95 %      Physical Exam  General: Well appearing, nontoxic.  Resting comfortably  Head:  Scalp, face, and head appear normal  Eyes:  Pupils are equal, round, and reactive to light    Conjunctivae non-injected and sclerae white  ENT:    The external nose is normal    Pinnae are normal    The oropharynx is normal, mucous membranes moist    Uvula is in the midline  Neck:  Normal range of motion    There is no rigidity noted    Trachea is in the midline  CV:  Regular rate and rhythm     Normal S1/S2, no S3/S4    No murmur or rub  Resp:  Lungs are clear and equal bilaterally    There is no tachypnea    No increased work of breathing    No rales, wheezing, or rhonchi  GI:  Abdomen is soft, no rigidity or guarding    No distension, or mass    No tenderness or rebound tenderness  Rectal Exam:  No external anal lesions. Rectal tone normal. No bright red blood or melena. Prostate normal. No internal masses or lesions palpated.  MS:  Normal muscular tone    Symmetric motor strength    No lower extremity edema  Skin:  No rash or acute skin lesions noted  Neuro: Awake and alert    Speech is normal and fluent    Moves all extremities spontaneously  Psych:  Normal affect.  Appropriate interactions.      Emergency Department Course     Laboratory:  Results for orders placed or performed during the hospital encounter of 09/01/19 (from the past 24 hour(s))   CBC with platelets differential   Result Value Ref Range    WBC 8.8 4.0 - 11.0 10e9/L    RBC Count 4.34 (L) 4.4 -  5.9 10e12/L    Hemoglobin 12.3 (L) 13.3 - 17.7 g/dL    Hematocrit 38.7 (L) 40.0 - 53.0 %    MCV 89 78 - 100 fl    MCH 28.3 26.5 - 33.0 pg    MCHC 31.8 31.5 - 36.5 g/dL    RDW 12.4 10.0 - 15.0 %    Platelet Count 539 (H) 150 - 450 10e9/L    Diff Method Automated Method     % Neutrophils 76.0 %    % Lymphocytes 17.5 %    % Monocytes 3.1 %    % Eosinophils 2.2 %    % Basophils 0.1 %    % Immature Granulocytes 1.1 %    Nucleated RBCs 0 0 /100    Absolute Neutrophil 6.7 1.6 - 8.3 10e9/L    Absolute Lymphocytes 1.5 0.8 - 5.3 10e9/L    Absolute Monocytes 0.3 0.0 - 1.3 10e9/L    Absolute Eosinophils 0.2 0.0 - 0.7 10e9/L    Absolute Basophils 0.0 0.0 - 0.2 10e9/L    Abs Immature Granulocytes 0.1 0 - 0.4 10e9/L    Absolute Nucleated RBC 0.0    Comprehensive metabolic panel   Result Value Ref Range    Sodium 137 133 - 144 mmol/L    Potassium 3.6 3.4 - 5.3 mmol/L    Chloride 103 94 - 109 mmol/L    Carbon Dioxide 29 20 - 32 mmol/L    Anion Gap 5 3 - 14 mmol/L    Glucose 104 (H) 70 - 99 mg/dL    Urea Nitrogen 19 7 - 30 mg/dL    Creatinine 0.71 0.66 - 1.25 mg/dL    GFR Estimate >90 >60 mL/min/[1.73_m2]    GFR Estimate If Black >90 >60 mL/min/[1.73_m2]    Calcium 8.9 8.5 - 10.1 mg/dL    Bilirubin Total 0.2 0.2 - 1.3 mg/dL    Albumin 2.8 (L) 3.4 - 5.0 g/dL    Protein Total 7.6 6.8 - 8.8 g/dL    Alkaline Phosphatase 53 40 - 150 U/L    ALT 45 0 - 70 U/L    AST 32 0 - 45 U/L      Interventions:  Medications - No data to display      Emergency Department Course:  Patient seen and evaluated. Workup above. Results and findings discussed with the patient.       Impression & Plan      Medical Decision Making:  Vijay Garg is a 28 year old male who presents for evaluation of concern of bloody diarrhea in the setting of multiple recent antibiotic treatment for bilateral pneumonia.  On my evaluation he is well-appearing, afebrile, nontoxic.  Abdominal exam is benign without evidence of peritonitis or acute surgical emergency.  Broad  differential diagnosis is considered including antibiotic associated diarrhea, C. difficile diarrhea, colitis, dysentery, lower GI bleed, upper GI bleed.  Patient reports his pneumonia symptoms have improved significantly.  No hypoxia, increased work of breathing, respiratory distress or evidence of sepsis.  Rectal exam was performed to completely normal.  No evidence of melena or blood.  No anal fissure or hemorrhoids.  CMP is normal.  CBC is consistent with baseline.  He has a very minimal chronic anemia with hemoglobin of 12.3 which is stable from prior.  Patient has no significant leukocytosis, recurrent fever, abdominal pain to suggest a severe infectious colitis such as C. difficile.  Ultimately I feel that his symptoms are due to antibiotic associated diarrhea as he has been on doxycycline, amoxicillin, azithromycin.  I recommended that he initiate probiotics and consider taking fiber supplementation.  We discussed that the diarrhea should be self-limited and improve once he has completed his course of antibiotics.  I recommended close primary care follow-up, especially if symptoms worsen or do not improve.  Patient was agreeable to plan of care.  Close return precautions were provided and the patient was discharged in stable condition.    Critical Care time:  none    Diagnosis:    ICD-10-CM    1. Antibiotic-associated diarrhea K52.1     T36.95XA        Disposition:  discharged to home    Discharge Medications:  Discharge Medication List as of 9/1/2019  9:54 PM      START taking these medications    Details   Probiotic Product (VISBIOME PROBIOTIC HIGH POT) CAPS Take 2 capfuls by mouth 2 times daily (with meals), Disp-60 capsule, R-0, Local Print             Kiel Doyle MD  9/1/2019   [facility] EMERGENCY DEPARTMENT       Kiel Doyle MD  09/02/19 9827

## 2020-03-10 ENCOUNTER — HEALTH MAINTENANCE LETTER (OUTPATIENT)
Age: 30
End: 2020-03-10

## 2020-09-09 ENCOUNTER — APPOINTMENT (OUTPATIENT)
Dept: GENERAL RADIOLOGY | Facility: CLINIC | Age: 30
End: 2020-09-09
Attending: PHYSICIAN ASSISTANT
Payer: COMMERCIAL

## 2020-09-09 ENCOUNTER — HOSPITAL ENCOUNTER (EMERGENCY)
Facility: CLINIC | Age: 30
Discharge: HOME | End: 2020-09-09
Attending: PHYSICIAN ASSISTANT | Admitting: PHYSICIAN ASSISTANT
Payer: COMMERCIAL

## 2020-09-09 VITALS
TEMPERATURE: 97.3 F | SYSTOLIC BLOOD PRESSURE: 126 MMHG | DIASTOLIC BLOOD PRESSURE: 83 MMHG | OXYGEN SATURATION: 98 % | RESPIRATION RATE: 18 BRPM | HEART RATE: 90 BPM

## 2020-09-09 DIAGNOSIS — S09.90XA CLOSED HEAD INJURY, INITIAL ENCOUNTER: ICD-10-CM

## 2020-09-09 DIAGNOSIS — S62.309A CLOSED FRACTURE OF METACARPAL BONE: ICD-10-CM
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
## 2020-09-09 PROCEDURE — 26600 TREAT METACARPAL FRACTURE: CPT | Mod: RT

## 2020-09-09 PROCEDURE — 99284 EMERGENCY DEPT VISIT MOD MDM: CPT | Mod: 25

## 2020-09-09 PROCEDURE — 90715 TDAP VACCINE 7 YRS/> IM: CPT | Performed by: PHYSICIAN ASSISTANT

## 2020-09-09 PROCEDURE — 90471 IMMUNIZATION ADMIN: CPT

## 2020-09-09 PROCEDURE — 25000128 H RX IP 250 OP 636: Performed by: PHYSICIAN ASSISTANT

## 2020-09-09 PROCEDURE — 73130 X-RAY EXAM OF HAND: CPT | Mod: RT

## 2020-09-09 RX ORDER — OXYCODONE HYDROCHLORIDE 5 MG/1
5 TABLET ORAL EVERY 6 HOURS PRN
Qty: 10 TABLET | Refills: 0 | Status: SHIPPED | OUTPATIENT
Start: 2020-09-09

## 2020-09-09 RX ADMIN — CLOSTRIDIUM TETANI TOXOID ANTIGEN (FORMALDEHYDE INACTIVATED), CORYNEBACTERIUM DIPHTHERIAE TOXOID ANTIGEN (FORMALDEHYDE INACTIVATED), BORDETELLA PERTUSSIS TOXOID ANTIGEN (GLUTARALDEHYDE INACTIVATED), BORDETELLA PERTUSSIS FILAMENTOUS HEMAGGLUTININ ANTIGEN (FORMALDEHYDE INACTIVATED), BORDETELLA PERTUSSIS PERTACTIN ANTIGEN, AND BORDETELLA PERTUSSIS FIMBRIAE 2/3 ANTIGEN 0.5 ML: 5; 2; 2.5; 5; 3; 5 INJECTION, SUSPENSION INTRAMUSCULAR at 16:38

## 2020-09-09 ASSESSMENT — ENCOUNTER SYMPTOMS
ARTHRALGIAS: 1
HEADACHES: 0
NUMBNESS: 1
LIGHT-HEADEDNESS: 0
VOMITING: 0

## 2020-09-09 NOTE — ED AVS SNAPSHOT
[facility] Emergency Department  [address]  [facility] [address]  Phone:  [phone]  Fax:  [phone]                                    Vijay Garg   MRN: [id number]    Department:  [facility] Emergency Department   Date of Visit:  9/9/2020           After Visit Summary Signature Page    I have received my discharge instructions, and my questions have been answered. I have discussed any challenges I see with this plan with the nurse or doctor.    ..........................................................................................................................................  Patient/Patient Representative Signature      ..........................................................................................................................................  Patient Representative Print Name and Relationship to Patient    ..................................................               ................................................  Date                                   Time    ..........................................................................................................................................  Reviewed by Signature/Title    ...................................................              ..............................................  Date                                               Time          22EPIC Rev 08/18

## 2020-09-09 NOTE — ED PROVIDER NOTES
History   Chief Complaint  Hand Pain    HPI   Vijay Garg is a right handed 29 year old male who presents for evaluation of hand pain. The patient reports that he was skateboarding about 3 hours ago when he felt off and landed on his right hand. The patient reports that he did hit his head but was wearing a helmet. The pain in his hand is currently a 7-8/10 and is also somewhat numb. He iced it at home. Denies any syncope, headache, emesis, visual changes, or lightheadedness. The patient did note an abrasion on his right elbow as well. His last T/dap was in 2004.     Allergies  Sulfa Drugs    Medications  Albuterol  Zithromax    Past Medical History  Depression    Past Surgical History  The patient does not have any pertinent past surgical history.    Family History  Diabetes  Heart disease  Depression     Social History  Tobacco use: former smoker  Alcohol use: yes  Drug use: cannabis  Marital Status:      Review of Systems   Eyes: Negative for visual disturbance.   Gastrointestinal: Negative for vomiting.   Musculoskeletal: Positive for arthralgias.   Neurological: Positive for numbness. Negative for syncope, light-headedness and headaches.   All other systems reviewed and are negative.    Physical Exam     Patient Vitals for the past 24 hrs:   BP Temp Pulse Resp SpO2   09/09/20 1329 126/83 97.3  F (36.3  C) 90 18 98 %     Physical Exam  General: Well appearing, well nourished. Normal mood and affect.  Skin: Superficial abrasion to right elbow.  No active bleeding.  HEENT: Head: Normocephalic, no visible or palpable masses. No raccoon eyes or garcia sign.    Eyes: Conjunctiva clear, PERRL, EOMs intact.   Ears: EACs clear, TMs translucent.   Nose: No septal hematoma or signs of epistaxis.   Throat/pharynx: Mucous membranes moist, no mucosal lesions or lacerations.   Cardiac: Normal rate and regular rhythm, no murmur or gallop.   Lungs: Clear to auscultation.  Abdomen: Abdomen soft, non-tender. No rebound  tenderness of guarding.   Musculoskeletal:  Right Wrist / Hand: Tenderness and edema throughout the fifth metacarpal of the right hand.  Pain with flexion and extension of the fifth digit on the right hand although range of motion is preserved.  No tenderness palpation along the distal radius or ulna.  No snuffbox tenderness.  No tenderness palpation throughout the remaining wrist or finger bones.  Full flexion extension of wrist without pain or difficulty. The finger flexors (FDS/FDP) and extensors are intact. Able to make okay sign, thumbs up, peace sign and cross fingers.  The thumb exam is normal, including: Adduction, abduction, flexion, extension, opposition. There are no sensory deficits, Median, Ulnar, and Radial nerve function is normal. Radial artery pulsations are normal. Capillary refill is normal.     Right elbow: Superficial abrasion overlying this area.  No tenderness to palpation throughout the elbow.  Full flexion, extension, supination, pronation of elbow without difficulty.      No cervical, thoracic, lumbar spine tenderness to palpation. Full ROM of neck without pain. No tenderness and good strength throughout shoulders, upper extremities, hips, lower extremities. Normal gait and station.  Neurologic: Oriented x 3. GCS: 15. CN II-XII intact. Normal finger to nose and rapid hand movements. Normal sensation throughout upper and lower extremities.   Psychiatric: Intact recent and remote memory, judgment and insight, normal mood and affect.     Emergency Department Course   Imaging:  Radiology findings were communicated with the patient who voiced understanding of the findings.    XR Hand 3 views, right:   There is an acute oblique fracture through the distal   fifth metacarpal diaphysis with mild volar angulation and 2 mm radial   displacement. Adjacent soft tissue swelling. No other bony or soft   tissue abnormality.  as per radiology.     Procedures  Ulnar Gutter Splint Placement     Splint was  applied and after placement I checked and adjusted the fit to ensure proper positioning. Patient was more comfortable with splint in place. Sensation and circulation are intact after splint placement.    Interventions:   Tdap 0.5 mL intramuscular     Emergency Department Course:  Past medical records, nursing notes, and vitals reviewed.    1410 I physically examined the patient as documented above.    The patient was sent for radiographs while in the emergency department, results above.     1530 I consulted with Viri cabrales regarding the patient's history and presentation here in the emergency department.     I rechecked the patient and discussed the findings of their workup thus far.     Findings and plan explained to the Patient. Patient discharged home with instructions regarding supportive care, medications, and reasons to return. The importance of close follow-up was reviewed. The patient was prescribed Roxicodone.     I personally reviewed the imaging results with the Patient and answered all related questions prior to discharge.     Impression & Plan   Medical Decision Making:  Vijay Garg is a 29 year old male who presents for evaluation of right hand pain. Details of the patient's history can be noted in the HPI. Differential diagnosis included sprain, strain, fracture, dislocation, contusion, septic arthritis, amongst others. Due to concern for fracture, xray obtained. This returned showing evidence of a fifth metacarpal fracture.there was angulation of this, I did discuss dick the case with on-call orthopedics, who recommended splinting and not attempting to reduce this at this time, which they may attempt to do at outpatient orthopedic clinic.  Therefore, we did put him in a ulnar gutter splint. The patients neurovascular status is normal. Also on exam, no signs of compartment syndrome, septic arthritis, gout, pseudogout, fracture, cellulitis, etc. Remaining exam negative for other injury. Splint  was placed as noted above in the procedure note.  Splint care also discussed. [facility] information also provided to the patient and they will call tomorrow to schedule follow-up.  Patient also had close head injury with his fall today.  He was wearing a helmet, no loss of consciousness.  He did not meet [place] head CT criteria.  However, we did discuss second impact syndrome, concussion, potential for delayed bleed, red flag head injury return precautions.  Patient expressed understanding.  All questions were answered prior to the patient's discharge.  Tylenol ibuprofen, ice for pain.  Oxycodone for breakthrough pain.  He was in agreement with the plan stated above.     Diagnosis:    ICD-10-CM    1. Closed fracture of metacarpal bone  S62.309A    2. Fall, initial encounter  W19.XXXA    3. Closed head injury, initial encounter  S09.90XA      Disposition:  Discharged to home.    Discharge Medications:  New Prescriptions    OXYCODONE (ROXICODONE) 5 MG TABLET    Take 1 tablet (5 mg) by mouth every 6 hours as needed for pain     Scribe Disclosure:  I, Noam Herrera, am serving as a scribe at 1:34 PM on 9/9/2020 to document services personally performed by Fide Coleman PA based on my observations and the provider's statements to me.     Dragon Disclosure   This was created at least in part with a voice recognition software. Mistakes/typos may be present.      Fide Coleman PA  09/09/20 [id number]

## 2020-09-09 NOTE — ED PROVIDER NOTES
Emergency Department Attending Supervision Note  9/9/2020  4:39 PM      I evaluated this patient in conjunction with Fide Coleman PA      Briefly, the patient presented with hand pain after mechanical fall.      On my exam, patient with mild swelling over hand, though is able to put hand through range of motion.  Normal capillary refill.  Normal sensation. Patient also has abrasion of proximal forearm.  Tetanus is not up-to-date and was updated today.    Results: X-ray suggests fifth metacarpal fracture.      Procedures:    Splint Procedure Note:    Splint type: Ulnar gutter.   Material: Plaster  Location: Right hand.   Indication: Fracture    Performed by: Joel Remy MD    Procedure: Cast padding applied to skin with particular attention applied to bony prominences, splint applied in usual fashion.  Post splint sensation, motor, cap refill intact.  Potential complications from splinting were discussed with patient including signs splint is too tight causing compartment syndrome or ischemia including: persistent uncontrolled pain, sensory changes/loss, discoloration of distal portions of the affected extremity.  The patient verbalized understanding and agreement.        My impression is:   1. 5th metacarpal fracture, closed.     Scribe disclosure:   I, Xavier Aimee, am serving as a scribe at 4:39 PM on 9/9/2020 to document services personally performed by Joel Remy MD based on my observations and the provider's statements to me.         Joel Remy MD  09/10/20 0334

## 2020-09-09 NOTE — ED TRIAGE NOTES
Patient presents with complaints of right hand pain and swelling patient states he fell off a skateboard. CMS intact. ABC intact without need for intervention at this time.

## 2020-09-09 NOTE — DISCHARGE INSTRUCTIONS
Tylenol and ibuprofen for pain control. You can take up to 1000 mg or 1 g of Tylenol.  You can take 600 mg of ibuprofen at one time.  You can take these together every 6 hours if needed.  Always take ibuprofen with food.  Never take more than 4 g (4000 mg) of Tylenol in 1 day.  Do not take this amount for more than 1 week at a time.  If pain is not controlled, use oxycodone.  Follow-up with hand specialty.  You can either call your previous surgeon or call the number above.  Return for uncontrolled pain, numbness in the hand, new concerns.    Discharge Instructions  Splint Care    You had a splint put on today to help protect your injury and help it heal.  Splints are used to treat things like strains, sprains, cuts and fractures (broken bones).    Be sure your splint is not too tight!  If you splint is too tight, it may cause loss of blood supply.  Signs of your splint being too tight include:  your arm or leg hurting a lot more; your fingers or toes getting numb, cold, pale or blue; or your child is crying, fussing or seeming restless.    Return to the Emergency Department right away if:  You have increased pain or pressure around the injury.  You have numbness, tingling, or cool, pale, or blue toes or fingers past the injury.  Your child is more fussy than normal, crying a lot, or restless.  Your splint becomes soft, breaks, or is wet.  Your splint begins to smell bad.  Your splint is cutting into your skin.    Home care:  Keep the injured area above the level of your heart while laying or sitting down.  This will help decrease the swelling and the pain.  Keep the splint dry.  Do not put objects down or inside the splint.  If there is an elastic bandage (Ace  wrap) holding the splint on this may be loosened slightly to relieve pressure or pain.  If pain continues return to the Emergency Department right away.  Do not remove your splint by yourself unless told to by your doctor.    Follow-up:  Sometimes the splint  put on in the Emergency Department needs to be changed once the swelling has gone down and a more permanent cast needs to be placed.  This is usually done by a bone specialist doctor (Orthopedist).  Follow the instructions given to you by your doctor today.    X-rays:  X-rays done today were read by your doctor but will also be read by a radiologist.  We will contact you if the radiologist sees anything different on the x-ray.  Your regular doctor may also want to review your x-rays on follow-up.    You could have a fracture (break), even if we told you your x-rays were normal. X-rays are not always certain, and some fractures are hard to see and may not show up right away.  Also, your x-ray may look like you have a fracture, even though you do not.  It is important to follow-up with your regular doctor.     If you were given a prescription for medicine here today, be sure to read all of the information (including the package insert) that comes with your prescription.  This will include important information about the medicine, its side effects, and any warnings that you need to know about.  The pharmacist who fills the prescription can provide more information and answer questions you may have about the medicine.  If you have questions or concerns that the pharmacist cannot address, please call or return to the Emergency Department.   Opioid Medication Information    Pain medications are among the most commonly prescribed medicines, so we are including this information for all our patients. If you did not receive pain medication or get a prescription for pain medicine, you can ignore it.     You may have been given a prescription for an opioid (narcotic) pain medicine and/or have received a pain medicine while here in the Emergency Department. These medicines can make you drowsy or impaired. You must not drive, operate dangerous equipment, or engage in any other dangerous activities while taking these medications.  "If you drive while taking these medications, you could be arrested for DUI, or driving under the influence. Do not drink any alcohol while you are taking these medications.     Opioid pain medications can cause addiction. If you have a history of chemical dependency of any type, you are at a higher risk of becoming addicted to pain medications.  Only take these prescribed medications to treat your pain when all other options have been tried. Take it for as short a time and as few doses as possible. Store your pain pills in a secure place, as they are frequently stolen and provide a dangerous opportunity for children or visitors in your house to start abusing these powerful medications. We will not replace any lost or stolen medicine.  As soon as your pain is better, you should flush all your remaining medication.     Many prescription pain medications contain Tylenol  (acetaminophen), including Vicodin , Tylenol #3 , Norco , Lortab , and Percocet .  You should not take any extra pills of Tylenol  if you are using these prescription medications or you can get very sick.  Do not ever take more than 3000 mg of acetaminophen in any 24 hour period.    All opioids tend to cause constipation. Drink plenty of water and eat foods that have a lot of fiber, such as fruits, vegetables, prune juice, apple juice and high fiber cereal.  Take a laxative if you don t move your bowels at least every other day. Miralax , Milk of Magnesia, Colace , or Senna  can be used to keep you regular.      Remember that you can always come back to the Emergency Department if you are not able to see your regular doctor in the amount of time listed above, if you get any new symptoms, or if there is anything that worries you.    He also had head injury today.  There is no signs of concussion or more serious injury, however look for signs of this. Symptoms of a concussion can include headache, nausea, dizziness, fatigue, \"out of it feeling.\"  If " these present, brain rest at home, cut screen time in half, no physical activity, see primary care provider for recheck before returning to normal activities.  Reasons to return to the emergency department would be for multiple episodes of vomiting, confusion, loss of consciousness, seizures at home.  These are signs of more serious head injury and you should be reevaluated.

## 2020-12-27 ENCOUNTER — HEALTH MAINTENANCE LETTER (OUTPATIENT)
Age: 30
End: 2020-12-27

## 2021-04-24 ENCOUNTER — HEALTH MAINTENANCE LETTER (OUTPATIENT)
Age: 31
End: 2021-04-24

## 2021-10-04 ENCOUNTER — HEALTH MAINTENANCE LETTER (OUTPATIENT)
Age: 31
End: 2021-10-04

## 2022-05-15 ENCOUNTER — HEALTH MAINTENANCE LETTER (OUTPATIENT)
Age: 32
End: 2022-05-15

## 2022-09-11 ENCOUNTER — HEALTH MAINTENANCE LETTER (OUTPATIENT)
Age: 32
End: 2022-09-11

## 2022-12-04 ENCOUNTER — HOSPITAL ENCOUNTER (EMERGENCY)
Facility: CLINIC | Age: 32
Discharge: HOME | End: 2022-12-04
Attending: EMERGENCY MEDICINE | Admitting: EMERGENCY MEDICINE
Payer: COMMERCIAL

## 2022-12-04 ENCOUNTER — APPOINTMENT (OUTPATIENT)
Dept: CT IMAGING | Facility: CLINIC | Age: 32
End: 2022-12-04
Attending: EMERGENCY MEDICINE
Payer: COMMERCIAL

## 2022-12-04 VITALS
DIASTOLIC BLOOD PRESSURE: 84 MMHG | HEART RATE: 80 BPM | TEMPERATURE: 98 F | WEIGHT: 163 LBS | RESPIRATION RATE: 18 BRPM | SYSTOLIC BLOOD PRESSURE: 124 MMHG | BODY MASS INDEX: 22.73 KG/M2 | OXYGEN SATURATION: 98 %

## 2022-12-04 DIAGNOSIS — R10.9 FLANK PAIN: ICD-10-CM

## 2022-12-04 DIAGNOSIS — R10.9 ABDOMINAL PAIN OF UNKNOWN CAUSE: ICD-10-CM

## 2022-12-04 DIAGNOSIS — F43.9 SITUATIONAL STRESS: ICD-10-CM

## 2022-12-04 DIAGNOSIS — R19.7 BLOODY DIARRHEA: ICD-10-CM

## 2022-12-04 LAB
ALBUMIN SERPL BCG-MCNC: 4.5 G/DL (ref 3.5–5.2)
ALBUMIN UR-MCNC: NEGATIVE MG/DL
ALP SERPL-CCNC: 94 U/L (ref 40–129)
ALT SERPL W P-5'-P-CCNC: 17 U/L (ref 10–50)
ANION GAP SERPL CALCULATED.3IONS-SCNC: 8 MMOL/L (ref 7–15)
APPEARANCE UR: CLEAR
AST SERPL W P-5'-P-CCNC: 24 U/L (ref 10–50)
BASOPHILS # BLD AUTO: 0.1 10E3/UL (ref 0–0.2)
BASOPHILS NFR BLD AUTO: 1 %
BILIRUB SERPL-MCNC: 0.3 MG/DL
BILIRUB UR QL STRIP: NEGATIVE
BUN SERPL-MCNC: 10.3 MG/DL (ref 6–20)
CALCIUM SERPL-MCNC: 9.3 MG/DL (ref 8.6–10)
CHLORIDE SERPL-SCNC: 101 MMOL/L (ref 98–107)
COLOR UR AUTO: ABNORMAL
CREAT SERPL-MCNC: 0.95 MG/DL (ref 0.67–1.17)
DEPRECATED HCO3 PLAS-SCNC: 29 MMOL/L (ref 22–29)
EOSINOPHIL # BLD AUTO: 0.3 10E3/UL (ref 0–0.7)
EOSINOPHIL NFR BLD AUTO: 4 %
ERYTHROCYTE [DISTWIDTH] IN BLOOD BY AUTOMATED COUNT: 12.2 % (ref 10–15)
GFR SERPL CREATININE-BSD FRML MDRD: >90 ML/MIN/1.73M2
GLUCOSE SERPL-MCNC: 103 MG/DL (ref 70–99)
GLUCOSE UR STRIP-MCNC: NEGATIVE MG/DL
HCT VFR BLD AUTO: 45.1 % (ref 40–53)
HGB BLD-MCNC: 14.9 G/DL (ref 13.3–17.7)
HGB UR QL STRIP: NEGATIVE
IMM GRANULOCYTES # BLD: 0 10E3/UL
IMM GRANULOCYTES NFR BLD: 0 %
KETONES UR STRIP-MCNC: NEGATIVE MG/DL
LEUKOCYTE ESTERASE UR QL STRIP: NEGATIVE
LIPASE SERPL-CCNC: 39 U/L (ref 13–60)
LYMPHOCYTES # BLD AUTO: 3.2 10E3/UL (ref 0.8–5.3)
LYMPHOCYTES NFR BLD AUTO: 44 %
MCH RBC QN AUTO: 29.6 PG (ref 26.5–33)
MCHC RBC AUTO-ENTMCNC: 33 G/DL (ref 31.5–36.5)
MCV RBC AUTO: 90 FL (ref 78–100)
MONOCYTES # BLD AUTO: 0.7 10E3/UL (ref 0–1.3)
MONOCYTES NFR BLD AUTO: 10 %
NEUTROPHILS # BLD AUTO: 2.9 10E3/UL (ref 1.6–8.3)
NEUTROPHILS NFR BLD AUTO: 41 %
NITRATE UR QL: NEGATIVE
NRBC # BLD AUTO: 0 10E3/UL
NRBC BLD AUTO-RTO: 0 /100
PH UR STRIP: 7.5 [PH] (ref 5–7)
PLATELET # BLD AUTO: 234 10E3/UL (ref 150–450)
POTASSIUM SERPL-SCNC: 4.7 MMOL/L (ref 3.4–5.3)
PROT SERPL-MCNC: 7 G/DL (ref 6.4–8.3)
RBC # BLD AUTO: 5.03 10E6/UL (ref 4.4–5.9)
RBC URINE: 0 /HPF
SODIUM SERPL-SCNC: 138 MMOL/L (ref 136–145)
SP GR UR STRIP: 1.02 (ref 1–1.03)
SQUAMOUS EPITHELIAL: <1 /HPF
UROBILINOGEN UR STRIP-MCNC: NORMAL MG/DL
WBC # BLD AUTO: 7.2 10E3/UL (ref 4–11)
WBC URINE: 1 /HPF

## 2022-12-04 PROCEDURE — 96375 TX/PRO/DX INJ NEW DRUG ADDON: CPT

## 2022-12-04 PROCEDURE — 36415 COLL VENOUS BLD VENIPUNCTURE: CPT | Performed by: EMERGENCY MEDICINE

## 2022-12-04 PROCEDURE — 96374 THER/PROPH/DIAG INJ IV PUSH: CPT | Mod: 59

## 2022-12-04 PROCEDURE — 74177 CT ABD & PELVIS W/CONTRAST: CPT

## 2022-12-04 PROCEDURE — 81001 URINALYSIS AUTO W/SCOPE: CPT | Performed by: EMERGENCY MEDICINE

## 2022-12-04 PROCEDURE — 250N000011 HC RX IP 250 OP 636: Performed by: EMERGENCY MEDICINE

## 2022-12-04 PROCEDURE — 82040 ASSAY OF SERUM ALBUMIN: CPT | Performed by: EMERGENCY MEDICINE

## 2022-12-04 PROCEDURE — 250N000009 HC RX 250: Performed by: EMERGENCY MEDICINE

## 2022-12-04 PROCEDURE — 99285 EMERGENCY DEPT VISIT HI MDM: CPT | Mod: 25

## 2022-12-04 PROCEDURE — 85025 COMPLETE CBC W/AUTO DIFF WBC: CPT | Performed by: EMERGENCY MEDICINE

## 2022-12-04 PROCEDURE — 83690 ASSAY OF LIPASE: CPT | Performed by: EMERGENCY MEDICINE

## 2022-12-04 PROCEDURE — 80053 COMPREHEN METABOLIC PANEL: CPT | Performed by: EMERGENCY MEDICINE

## 2022-12-04 RX ORDER — ONDANSETRON 2 MG/ML
4 INJECTION INTRAMUSCULAR; INTRAVENOUS ONCE
Status: COMPLETED | OUTPATIENT
Start: 2022-12-04 | End: 2022-12-04

## 2022-12-04 RX ORDER — LIDOCAINE 50 MG/G
1 PATCH TOPICAL EVERY 24 HOURS
Qty: 10 PATCH | Refills: 0 | Status: SHIPPED | OUTPATIENT
Start: 2022-12-04 | End: 2022-12-14

## 2022-12-04 RX ORDER — CYCLOBENZAPRINE HCL 10 MG
10 TABLET ORAL 3 TIMES DAILY PRN
Qty: 20 TABLET | Refills: 0 | Status: SHIPPED | OUTPATIENT
Start: 2022-12-04 | End: 2022-12-10

## 2022-12-04 RX ORDER — IOPAMIDOL 755 MG/ML
500 INJECTION, SOLUTION INTRAVASCULAR ONCE
Status: COMPLETED | OUTPATIENT
Start: 2022-12-04 | End: 2022-12-04

## 2022-12-04 RX ORDER — KETOROLAC TROMETHAMINE 15 MG/ML
15 INJECTION, SOLUTION INTRAMUSCULAR; INTRAVENOUS ONCE
Status: COMPLETED | OUTPATIENT
Start: 2022-12-04 | End: 2022-12-04

## 2022-12-04 RX ORDER — METHYLPREDNISOLONE 4 MG
TABLET, DOSE PACK ORAL
Qty: 21 TABLET | Refills: 0 | Status: SHIPPED | OUTPATIENT
Start: 2022-12-04

## 2022-12-04 RX ADMIN — ONDANSETRON 4 MG: 2 INJECTION INTRAMUSCULAR; INTRAVENOUS at 20:27

## 2022-12-04 RX ADMIN — SODIUM CHLORIDE 61 ML: 9 INJECTION, SOLUTION INTRAVENOUS at 20:42

## 2022-12-04 RX ADMIN — IOPAMIDOL 82 ML: 755 INJECTION, SOLUTION INTRAVENOUS at 20:42

## 2022-12-04 RX ADMIN — KETOROLAC TROMETHAMINE 15 MG: 15 INJECTION, SOLUTION INTRAMUSCULAR; INTRAVENOUS at 20:27

## 2022-12-04 ASSESSMENT — ENCOUNTER SYMPTOMS
FEVER: 0
VOMITING: 0
DYSURIA: 0
ABDOMINAL PAIN: 1
FREQUENCY: 0
NAUSEA: 1
BACK PAIN: 1
HEMATURIA: 0
CHILLS: 0

## 2022-12-04 ASSESSMENT — ACTIVITIES OF DAILY LIVING (ADL): ADLS_ACUITY_SCORE: 35

## 2022-12-05 NOTE — DISCHARGE INSTRUCTIONS
Discharge Instructions  Abdominal Pain    Abdominal pain (belly pain) can be caused by many things. Your evaluation today does not show the exact cause for your pain. Your provider today has decided that it is unlikely your pain is due to a life threatening problem, or a problem requiring surgery or hospital admission. Sometimes those problems cannot be found right away, so it is very important that you follow up as directed.  Sometimes only the changes which occur over time allow the cause of your pain to be found.    Generally, every Emergency Department visit should have a follow-up clinic visit with either a primary or a specialty clinic/provider. Please follow-up as instructed by your emergency provider today. With abdominal pain, we often recommend very close follow-up, such as the following day.    ADULTS:  Return to the Emergency Department right away if:    You get an oral temperature above 102oF or as directed by your provider.  You have blood in your stools. This may be bright red or appear as black, tarry stools.    You keep vomiting (throwing up) or cannot drink liquids.  You see blood when you vomit.   You cannot have a bowel movement or you cannot pass gas.  Your stomach gets bloated or bigger.  Your skin or the whites of your eyes look yellow.  You faint.  You have bloody, frequent or painful urination (peeing).  You have new symptoms or anything that worries you.    CHILDREN:  Return to the Emergency Department right away if your child has any of the above-listed symptoms or the following:    Pushes your hand away or screams/cries when his/her belly is touched.  You notice your child is very fussy or weak.  Your child is very tired and is too tired to eat or drink.  Your child is dehydrated.  Signs of dehydration can be:  Significant change in the amount of wet diapers/urine.  Your infant or child starts to have dry mouth and lips, or no saliva (spit) or tears.    PREGNANT WOMEN:  Return to the  Emergency Department right away if you have any of the above-listed symptoms or the following:    You have bleeding, leaking fluid or passing tissue from the vagina.  You have worse pain or cramping, or pain in your shoulder or back.  You have vomiting that will not stop.  You have a temperature of 100oF or more.  Your baby is not moving as much as usual.  You faint.  You get a bad headache with or without eye problems and abdominal pain.  You have a seizure.  You have unusual discharge from your vagina and abdominal pain.    Abdominal pain is pretty common during pregnancy.  Your pain may or may not be related to your pregnancy. You should follow-up closely with your OB provider so they can evaluate you and your baby.  Until you follow-up with your regular provider, do the following:     Avoid sex and do not put anything in your vagina.  Drink clear fluids.  Only take medications approved by your provider.    MORE INFORMATION:    Appendicitis:  A possible cause of abdominal pain in any person who still has their appendix is acute appendicitis. Appendicitis is often hard to diagnose.  Testing does not always rule out early appendicitis or other causes of abdominal pain. Close follow-up with your provider and re-evaluations may be needed to figure out the reason for your abdominal pain.    Follow-up:  It is very important that you make an appointment with your clinic and go to the appointment.  If you do not follow-up with your primary provider, it may result in missing an important development which could result in permanent injury or disability and/or lasting pain.  If there is any problem keeping your appointment, call your provider or return to the Emergency Department.    Medications:  Take your medications as directed by your provider today.  Before using over-the-counter medications, ask your provider and make sure to take the medications as directed.  If you have any questions about medications, ask your  "provider.    Diet:  Resume your normal diet as much as possible, but do not eat fried, fatty or spicy foods while you have pain.  Do not drink alcohol or have caffeine.  Do not smoke tobacco.    Probiotics: If you have been given an antibiotic, you may want to also take a probiotic pill or eat yogurt with live cultures. Probiotics have \"good bacteria\" to help your intestines stay healthy. Studies have shown that probiotics help prevent diarrhea (loose stools) and other intestine problems (including C. diff infection) when you take antibiotics. You can buy these without a prescription in the pharmacy section of the store.     If you were given a prescription for medicine here today, be sure to read all of the information (including the package insert) that comes with your prescription.  This will include important information about the medicine, its side effects, and any warnings that you need to know about.  The pharmacist who fills the prescription can provide more information and answer questions you may have about the medicine.  If you have questions or concerns that the pharmacist cannot address, please call or return to the Emergency Department.       Remember that you can always come back to the Emergency Department if you are not able to see your regular provider in the amount of time listed above, if you get any new symptoms, or if there is anything that worries you.     Discharge Instructions  Back Pain  You were seen today for back pain. Back pain can have many causes, but most will get better without surgery or other specific treatment. Sometimes there is a herniated ( slipped ) disc. We do not usually do MRI scans to look for these right away, since most herniated discs will get better on their own with time.  Today, we did not find any evidence that your back pain was caused by a serious condition. However, sometimes symptoms develop over time and cannot be found during an emergency visit, so it is very " important that you follow up with your primary provider.  Generally, every Emergency Department visit should have a follow-up clinic visit with either a primary or a specialty clinic/provider. Please follow-up as instructed by your emergency provider today.    Return to the Emergency Department if:  You develop a fever with your back pain.   You have weakness or change in sensation in one or both legs.  You lose control of your bowels or bladder, or cannot empty your bladder (cannot pee).  Your pain gets much worse.     Follow-up with your provider:  Unless your pain has completely gone away, please make an appointment with your provider within one week. Most of the routine care for back pain is available in a clinic and not the Emergency Department. You may need further management of your back pain, such as more pain medication, imaging such as an X-ray or MRI, or physical therapy.    What can I do to help myself?  Remain Active -- People are often afraid that they will hurt their back further or delay recovery by remaining active, but this is one of the best things you can do for your back. In fact, staying in bed for a long time to rest is not recommended. Studies have shown that people with low back pain recover faster when they remain active. Movement helps to bring blood flow to the muscles and relieve muscle spasms as well as preventing loss of muscle strength.  Heat -- Using a heating pad can help with low back pain during the first few weeks. Do not sleep with a heating pad, as you can be burned.   Pain medications - You may take a pain medication such as Tylenol  (acetaminophen), Advil , Motrin  (ibuprofen) or Aleve  (naproxen).  If you were given a prescription for medicine here today, be sure to read all of the information (including the package insert) that comes with your prescription.  This will include important information about the medicine, its side effects, and any warnings that you need to know  about.  The pharmacist who fills the prescription can provide more information and answer questions you may have about the medicine.  If you have questions or concerns that the pharmacist cannot address, please call or return to the Emergency Department.   Remember that you can always come back to the Emergency Department if you are not able to see your regular provider in the amount of time listed above, if you get any new symptoms, or if there is anything that worries you.     Discharge Instructions  Gastrointestinal (GI) Bleeding    You have been seen today because of gastrointestinal (GI) bleeding, bleeding somewhere along your digestive tract.  Most common symptoms are blood in the stool or when you have a bowel movement.  The most common causes of minor GI bleeding are ulcers and hemorrhoids.  Other conditions that cause bleeding include abnormal blood vessels in your GI tract, diverticulosis, inflammatory bowel disease, and cancer.  Fortunately, many cases of GI bleeding are not immediately life-threatening and it does not appear that your bleeding is serious enough to require admission to the hospital.    Generally, every Emergency Department visit should have a follow-up clinic visit with either a primary or a specialty clinic/provider. Please follow-up as instructed by your emergency provider today.    Return to the Emergency Department right away if you:  Develop fever with a temperature above 100.4 F.  Vomit (throw up) blood or something that looks like coffee grounds.  Have a bowel movement that looks like tar or has a large amount of blood in it.  Feel weak, light-headed, or faint.  Have a racing heartbeat.  Have abdominal (belly) pain that is new or increasing.  Have new symptoms that worry you.    At your follow up, your regular provider or GI specialist may order further testing such as:  Blood and stool tests.  Endoscopy and/or colonoscopy, where a tube with a camera is used to look at your  digestive tract.  Other very specialized tests depending on your symptoms.    What can I do to help myself?  Take any acid reducing medication prescribed by your provider.  Avoid over the counter medications such as aspirin and Advil , Motrin  (ibuprofen) that can thin your blood or irritate your stomach, making ulcers worse.  If you were given a prescription for medicine here today, be sure to read all of the information (including the package insert) that comes with your prescription.  This will include important information about the medicine, its side effects, and any warnings that you need to know about.  The pharmacist who fills the prescription can provide more information and answer questions you may have about the medicine.  If you have questions or concerns that the pharmacist cannot address, please call or return to the Emergency Department.   Remember that you can always come back to the Emergency Department if you are not able to see your regular provider in the amount of time listed above, if you get any new symptoms, or if there is anything that worries you.

## 2022-12-05 NOTE — ED PROVIDER NOTES
"Rapid Assessment Note    History:   Vijay Garg is a 32 year old male who presents with right lower abdominal pain that has been intermittent for the last week, worse today.  Patient reports whole right abdominal pain radiating down into his groin and into his right flank.  No aggravating or relieving factors. Denies fevers, chills, dysuria/urgency/frequency but reports having difficulty with urination.  Also reports having diarrhea today with 1 episode of bloody stool.  Has not taken any medications for his pain.  Reports seeing urology in 2020 and was told \"there was something wrong with one of the tubes\" and was told to do Kegel exercises; he is not sure what the diagnosis was for urology consultation but had an ultrasound of his testicles which showed inflammation.  States this does not feel the same as when he was seen by urology in 2020.    Exam:   General:  Alert, appears uncomfortable 2/2 right abdominal pain  Cardiovascular:  Well perfused  Lungs:  No respiratory distress, no accessory muscle use  Abdomen: Soft, right lower quadrant tenderness, nondistended. Guarding with palpation.  MSK: Right CVA tenderness  Neuro:  Moving all 4 extremities  Skin:  Warm, dry  Psych:  Normal affect    Plan of Care:   I evaluated the patient and developed an initial plan of care. I discussed this plan and explained that I, or one of my partners, would be returning to complete the evaluation.     12/4/2022  [facility]    Portions of this medical record were completed by a scribe. UPON MY REVIEW AND AUTHENTICATION BY ELECTRONIC SIGNATURE, this confirms (a) I performed the applicable clinical services, and (b) the record is accurate.        Hamlet No MD  12/04/22 2023    "

## 2022-12-05 NOTE — ED TRIAGE NOTES
Presents to triage with c/o R flank and RLQ abdominal pain that radiates into R leg. Patient stated this has been ongoing for about a week and is worsening today. Ibuprofen and pepcid at 1700.   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 Denies any SI.      Triage Assessment     Row Name 12/04/22 1927       Triage Assessment (Adult)    Airway WDL WDL       Respiratory WDL    Respiratory WDL WDL       Cardiac WDL    Cardiac WDL WDL

## 2022-12-05 NOTE — ED PROVIDER NOTES
"  History   Chief Complaint:  Abdominal Pain and Flank Pain       HPI   Vijay Garg is a 32 year old male with history of depression who presents with right lower abdominal pain radiating to back that has been intermittent for the last week but worsened today. He endorses nausea but denies vomiting, fevers, chills, dysuria, urgency, frequency, and hematuria. He has never had pain that feels similar to this. He reports being recommended physical therapy by a urologist in 2020 for abdominal pain and believes that his current pain could be related lack of psychical activity in the past month. Patient is dealing with several stresses including young children at home and feels a great deal of \"self pity\". He denies suicidal or homicidal thoughts.  He also incidentally notes occasional blood in his stool.  No clots.      Review of Systems   Constitutional: Negative for chills and fever.   Gastrointestinal: Positive for abdominal pain, blood in stool and nausea. Negative for vomiting.   Genitourinary: Negative for dysuria, frequency, hematuria and urgency.   Musculoskeletal: Positive for back pain.   All other systems reviewed and are negative.    Allergies:  Sulfa Drugs    Medications:  Albuterol    Past Medical History:     Depression  Tobacco use   Adjustment disorder with mixed anxiety and depressed mood    Past Surgical History:    Carpal tunnel release      Family History:    Father: diabetes, heart disease, aortic stenosis  Mother: stroke    Social History:  The patient presents to the ED alone  Living Situation: with wife  PCP: Rufina Tamez     Physical Exam     Patient Vitals for the past 24 hrs:   BP Temp Temp src Pulse Resp SpO2 Weight   12/04/22 1925 124/84 98  F (36.7  C) Temporal 80 18 98 % 73.9 kg (163 lb)       Physical Exam  General: Uncomfortable appearing adult male, laying on the stretcher  Eyes: PERRL, Conjunctive within normal limits  ENT: Moist mucous membranes, oropharynx clear.   CV: Normal " S1S2, no murmur, rub or gallop. Regular rate and rhythm  Resp: Clear to auscultation bilaterally, no wheezes, rales or rhonchi. Normal respiratory effort.  GI: Abdomen is soft and nondistended.  Right-sided tenderness to palpation.  No palpable masses. No rebound or guarding.  CVA tenderness to percussion on the right  MSK: Tender in the right paraspinal musculature low back.  No palpable masses.  No bony midline tenderness.  No extremity edema. Nontender. Normal active range of motion.  Skin: Warm and dry. No rashes or lesions or ecchymoses on visible skin.  Neuro: Alert and oriented. Responds appropriately to all questions and commands. No focal findings appreciated. Normal muscle tone.  Psych: Teary, anxious appearing.    Emergency Department Course   Imaging:  Abd/pelvis CT,  IV  contrast only TRAUMA / AAA   Final Result   IMPRESSION:    1.  No acute abnormality in the abdomen or pelvis. Normal appendix.        Report per radiology    Laboratory:  Labs Ordered and Resulted from Time of ED Arrival to Time of ED Departure   ROUTINE UA WITH MICROSCOPIC REFLEX TO CULTURE - Abnormal       Result Value    Color Urine Light Yellow      Appearance Urine Clear      Glucose Urine Negative      Bilirubin Urine Negative      Ketones Urine Negative      Specific Gravity Urine 1.020      Blood Urine Negative      pH Urine 7.5 (*)     Protein Albumin Urine Negative      Urobilinogen Urine Normal      Nitrite Urine Negative      Leukocyte Esterase Urine Negative      RBC Urine 0      WBC Urine 1      Squamous Epithelials Urine <1     COMPREHENSIVE METABOLIC PANEL - Abnormal    Sodium 138      Potassium 4.7      Chloride 101      Carbon Dioxide (CO2) 29      Anion Gap 8      Urea Nitrogen 10.3      Creatinine 0.95      Calcium 9.3      Glucose 103 (*)     Alkaline Phosphatase 94      AST 24      ALT 17      Protein Total 7.0      Albumin 4.5      Bilirubin Total 0.3      GFR Estimate >90     LIPASE - Normal    Lipase 39     CBC  WITH PLATELETS AND DIFFERENTIAL    WBC Count 7.2      RBC Count 5.03      Hemoglobin 14.9      Hematocrit 45.1      MCV 90      MCH 29.6      MCHC 33.0      RDW 12.2      Platelet Count 234      % Neutrophils 41      % Lymphocytes 44      % Monocytes 10      % Eosinophils 4      % Basophils 1      % Immature Granulocytes 0      NRBCs per 100 WBC 0      Absolute Neutrophils 2.9      Absolute Lymphocytes 3.2      Absolute Monocytes 0.7      Absolute Eosinophils 0.3      Absolute Basophils 0.1      Absolute Immature Granulocytes 0.0      Absolute NRBCs 0.0         Emergency Department Course:  Reviewed:  I reviewed nursing notes, vitals, past medical history and Care Everywhere    Assessments:  2126 I obtained history and examined the patient as noted above.   I reassessed the patient.  He is feeling comfortable with plan for discharge.  All questions were answered.    Interventions:  2027 Toradol, 15 mg, IV   2027 Zofran, 4 mg, IV    Disposition:  The patient was discharged to home.     Impression & Plan   Medical Decision Making:  Vijay Garg is a 32-year-old male who presents emergency room for right-sided abdominal and flank pain.  No sign of ultimately etiology considered including renal colic, UTI, bowel obstruction, acute appendicitis, musculoskeletal back pain with radiation into the abdomen, etc.  Thorough and comprehensive evaluation here did not show any worrisome findings or obvious explanation for symptoms.  His hemoglobin was normal.  No evidence to suggest significant hemorrhage.  Overall, however the patient's evaluation here is reassuring and I feel comfortable discharging him home with no apparent life-threatening or surgical process at this time.  He understands importance of follow-up given unclear cause for his symptoms.  Reassessment recommended within 2 to 3 days with his PCP.  Return to the emergency department worsening.  All questions were answered prior to discharge.    Diagnosis:     ICD-10-CM    1. Flank pain  R10.9       2. Abdominal pain of unknown cause  R10.9       3. Bloody diarrhea  R19.7       4. Situational stress  F43.9           Discharge Medications:  New Prescriptions    CYCLOBENZAPRINE (FLEXERIL) 10 MG TABLET    Take 1 tablet (10 mg) by mouth 3 times daily as needed for muscle spasms    LIDOCAINE (LIDODERM) 5 % PATCH    Place 1 patch onto the skin every 24 hours for 10 days    METHYLPREDNISOLONE (MEDROL DOSEPAK) 4 MG TABLET THERAPY PACK    Follow Package Directions       Scribe Disclosure:  I, Pierce Sy, am serving as a scribe at 9:22 PM on 12/4/2022 to document services personally performed by Dina Riley MD based on my observations and the provider's statements to me.         Dina Riley MD  01/28/23 [id number]

## 2022-12-08 ENCOUNTER — HOSPITAL ENCOUNTER (EMERGENCY)
Facility: CLINIC | Age: 32
Discharge: STILL ADMITTED OR ADMINISTRATIVE | End: 2022-12-08
Payer: COMMERCIAL

## 2023-01-28 ASSESSMENT — ENCOUNTER SYMPTOMS: BLOOD IN STOOL: 1

## 2024-05-04 ENCOUNTER — HEALTH MAINTENANCE LETTER (OUTPATIENT)
Age: 34
End: 2024-05-04

## 2025-02-12 ENCOUNTER — OFFICE VISIT (OUTPATIENT)
Dept: FAMILY MEDICINE | Facility: CLINIC | Age: 35
End: 2025-02-12

## 2025-02-12 ENCOUNTER — TELEPHONE (OUTPATIENT)
Dept: OTOLARYNGOLOGY | Facility: CLINIC | Age: 35
End: 2025-02-12

## 2025-02-12 VITALS
TEMPERATURE: 98.4 F | HEART RATE: 74 BPM | SYSTOLIC BLOOD PRESSURE: 118 MMHG | DIASTOLIC BLOOD PRESSURE: 85 MMHG | WEIGHT: 177.75 LBS | HEIGHT: 71 IN | RESPIRATION RATE: 18 BRPM | BODY MASS INDEX: 24.89 KG/M2 | OXYGEN SATURATION: 97 %

## 2025-02-12 DIAGNOSIS — R06.02 SHORTNESS OF BREATH: ICD-10-CM

## 2025-02-12 DIAGNOSIS — R05.3 CHRONIC COUGH: Primary | ICD-10-CM

## 2025-02-12 DIAGNOSIS — R09.81 NASAL CONGESTION: ICD-10-CM

## 2025-02-12 DIAGNOSIS — R21 RASH: ICD-10-CM

## 2025-02-12 DIAGNOSIS — R09.82 POSTNASAL DRIP: ICD-10-CM

## 2025-02-12 DIAGNOSIS — K21.9 GASTROESOPHAGEAL REFLUX DISEASE, UNSPECIFIED WHETHER ESOPHAGITIS PRESENT: ICD-10-CM

## 2025-02-12 LAB
CRP SERPL-MCNC: 3.22 MG/L
ERYTHROCYTE [SEDIMENTATION RATE] IN BLOOD BY WESTERGREN METHOD: 5 MM/HR (ref 0–15)
PROCALCITONIN SERPL IA-MCNC: 0.03 NG/ML

## 2025-02-12 PROCEDURE — 84145 PROCALCITONIN (PCT): CPT | Performed by: FAMILY MEDICINE

## 2025-02-12 PROCEDURE — 86140 C-REACTIVE PROTEIN: CPT | Performed by: FAMILY MEDICINE

## 2025-02-12 PROCEDURE — G2211 COMPLEX E/M VISIT ADD ON: HCPCS | Performed by: FAMILY MEDICINE

## 2025-02-12 PROCEDURE — 85652 RBC SED RATE AUTOMATED: CPT | Performed by: FAMILY MEDICINE

## 2025-02-12 PROCEDURE — 99204 OFFICE O/P NEW MOD 45 MIN: CPT | Performed by: FAMILY MEDICINE

## 2025-02-12 PROCEDURE — 36415 COLL VENOUS BLD VENIPUNCTURE: CPT | Performed by: FAMILY MEDICINE

## 2025-02-12 RX ORDER — DOXYCYCLINE 100 MG/1
100 CAPSULE ORAL 2 TIMES DAILY
Qty: 20 CAPSULE | Refills: 0 | Status: SHIPPED | OUTPATIENT
Start: 2025-02-12 | End: 2025-02-22

## 2025-02-12 RX ORDER — ALBUTEROL SULFATE 90 UG/1
2 INHALANT RESPIRATORY (INHALATION) EVERY 6 HOURS
Qty: 8.5 G | Refills: 5 | Status: SHIPPED | OUTPATIENT
Start: 2025-02-12

## 2025-02-12 RX ORDER — BUDESONIDE AND FORMOTEROL FUMARATE DIHYDRATE 160; 4.5 UG/1; UG/1
2 AEROSOL RESPIRATORY (INHALATION)
COMMUNITY
Start: 2025-01-27

## 2025-02-12 RX ORDER — MOMETASONE FUROATE 1 MG/G
CREAM TOPICAL DAILY PRN
Qty: 45 G | Refills: 0 | Status: SHIPPED | OUTPATIENT
Start: 2025-02-12

## 2025-02-12 RX ORDER — FAMOTIDINE 40 MG/1
40 TABLET, FILM COATED ORAL DAILY PRN
Qty: 30 TABLET | Refills: 1 | Status: SHIPPED | OUTPATIENT
Start: 2025-02-12

## 2025-02-12 ASSESSMENT — PATIENT HEALTH QUESTIONNAIRE - PHQ9
SUM OF ALL RESPONSES TO PHQ QUESTIONS 1-9: 27
SUM OF ALL RESPONSES TO PHQ QUESTIONS 1-9: 27
10. IF YOU CHECKED OFF ANY PROBLEMS, HOW DIFFICULT HAVE THESE PROBLEMS MADE IT FOR YOU TO DO YOUR WORK, TAKE CARE OF THINGS AT HOME, OR GET ALONG WITH OTHER PEOPLE: EXTREMELY DIFFICULT

## 2025-02-12 NOTE — TELEPHONE ENCOUNTER
FUTURE VISIT INFORMATION      FUTURE VISIT INFORMATION:  Date: 5/28/25  Time: 2:50PM  Location: [facility] – [place], [place]  REFERRAL INFORMATION:  Referring provider:   Nabil Zarco MD  Referring providers clinic:  [facility]  Reason for visit/diagnosis  Chronic cough [R05.3]; Shortness of breath [R06.02]; Gastroesophageal reflux disease, unspecified whether esophagitis present [K21.9]; Nasal congestion [R09.81]; Postnasal drip [R09.82]  REF BY Nabil Zarco MD  RECS IN Psychiatric  CONF LOC  SCHED W/PT     RECORDS REQUESTED FROM:       Clinic name Comments Records Status Imaging Status   [facility] 2/12/25- Ov w.  Nabil Zarco MD [facility]  8/30/19- OV w. Multiple provider  [facility]     Imaging  8/30/19- CT Chest  [facility]  PACS    [facility]  1/29/25- OV w. Eddi Fung DO    1/27/25- OV w. Radmanesh, Kaikhushroo Behram, MD    1/22/25- OV w. Darleen Rain MD      More in CE CE    [facility]  1/27/25- XR Chest   1/22/25- XR Chest   8/8/22- CT Sinus   7/6/22- CT Chest  [facility]  Pending

## 2025-02-12 NOTE — TELEPHONE ENCOUNTER
This encounter is being sent to inform the clinic that this patient has a referral from Nabil Zarco MD  for the diagnoses of Chronic cough [R05.3]; Shortness of breath [R06.02]; Gastroesophageal reflux disease, unspecified whether esophagitis present [K21.9]; Nasal congestion [R09.81]; Postnasal drip [R09.82]  and has requested that this patient be seen within 1-2 WEEKS and/or with ANY PROVIDER.  Based on the availability of our provider(s), we are unable to accommodate this request.    Were all sites offered this patient?  Yes    Does scheduling algorithm request to schedule next available?  Patient has been scheduled for the first available opening with STEPHENIE WELCH on 5/28.  We have informed the patient that the clinic will review their referral and reach out if a sooner appointment is medically necessary.     OPEN TO [facility] AND [facility]

## 2025-02-12 NOTE — PATIENT INSTRUCTIONS
-Thank you for choosing the [facility].  -It was a pleasure to see you today.  -Please take a look at the information below for more specific details regarding the treatment plan and recommendations.  -In this after visit summary is a list of your medications and specific instructions.  Please review this carefully as there may be changes made to your medication list.  -If there are any particular questions or concerns, please feel free to reach out to Dr. Zarco.  -If any labs have been completed, we will reach out to you about results.  If the results are normal or not concerning, a letter or MyChart message will be sent to you.  If any follow-up is needed, either Dr. Zarco or the nurse will give you a call.  If you have not heard regarding results after 2 weeks, please reach out to the clinic.    Patient Instructions:    -See the Ear, Nose, and Throat (ENT) doctor. A referral was placed for you.   -A CT scan was ordered.   -If you do not hear from the specialist to schedule an appointment within a week's time from today, please call the [facility] and speak with the specialty  to help you schedule the appointment to see the specialist.  Dr. Zarco would like for you to be seen within the next 1-2 weeks.   -Avoid calcium rich foods when taking the doxycycline. Eat food before taking the medication.       Please seek immediate medical attention (go to the emergency room or urgent care) for the following reasons: worsening symptoms, fevers, chills, or any concerning changes.      --------------------------------------------------------------------------------------------------------------------    -We are always looking for ways to improve.  You may be selected to receive a survey regarding your visit today.  We encourage you to complete the survey and provide specific, constructive feedback to help us improve our processes.  Thank you for your time!  -Please review the contact  information listed on the after visit summary and in the electronic chart.  Below is the phone number that we have on file.  If there are any changes that are needed to be made, please reach out to the clinic.  [phone] (home)

## 2025-02-12 NOTE — PROGRESS NOTES
OFFICE VISIT    Assessment/Plan:     Patient Instructions:    -See the Ear, Nose, and Throat (ENT) doctor. A referral was placed for you.   -A CT scan was ordered.   -If you do not hear from the specialist to schedule an appointment within a week's time from today, please call the [facility] and speak with the specialty  to help you schedule the appointment to see the specialist.  Dr. Zarco would like for you to be seen within the next 1-2 weeks.   -Avoid calcium rich foods when taking the doxycycline. Eat food before taking the medication.       Please seek immediate medical attention (go to the emergency room or urgent care) for the following reasons: worsening symptoms, fevers, chills, or any concerning changes.        Vijay was seen today for pneumonia and chronic cough.  Diagnoses and all orders for this visit:    Chronic cough  Shortness of breath: Patient has a chronic persistent cough that has worsened recently.  Both CRP and ESR are normal and procalcitonin is low.  This makes pneumonia fairly unlikely.  The patient works with homeless population.  Last TB test was 5-10 years ago.  Plan to recheck as below.  Trial with doxycycline to see if patient has any improvement.  Patient reported that he was given an inhaled version of azithromycin, though provider does not see this order on chart review.  Patient had been prescribed ciprofloxacin by urology about a month ago, though he does not recall how much of the medication he had taken.  Patient reports previously having worsening symptoms with use of steroid medications, so plans to hold off at this time.  Completed chest x-ray as below to further assess potential causes.  Patient does have a fair amount of nasal congestion along with some postnasal drip.  This could be contributing to the coughing symptoms.  Patient does also endorse worsening reflux symptoms, particularly over the last 1.5 months.  Plan to initiate famotidine as below.  Check  H. pylori test as well to rule out.  Referral has been placed to ENT through [facility], though patient is insistent that he does not want to be seen through [facility] anymore.  New referral placed today for ENT.  ENT would be helpful to address the chronic rhinorrhea and assess for potential reflux with resultant laryngeal irritation (which would lead to chronic cough).  Further recommendations will depend on results.  Cannot rule out component of anxiety that worsens underlying conditions.  Patient reports having worsening symptoms after use of prednisone and when he overuses the albuterol inhaler, which supports a diagnosis of anxiety with panic attacks.  Continue on Symbicort and albuterol as prescribed.  -     albuterol (PROVENTIL HFA) 108 (90 Base) MCG/ACT inhaler; Inhale 2 puffs into the lungs every 6 hours.  -     CT Chest w/o Contrast; Future  -     Quantiferon TB Gold Plus; Future  -     doxycycline hyclate (VIBRAMYCIN) 100 MG capsule; Take 1 capsule (100 mg) by mouth 2 times daily for 10 days.  -     Helicobacter pylori Antigen Stool; Future  -     Adult ENT  Referral; Future  -     Procalcitonin; Future  -     CRP inflammation; Future  -     Erythrocyte sedimentation rate auto; Future  -     famotidine (PEPCID) 40 MG tablet; Take 1 tablet (40 mg) by mouth daily as needed for heartburn.    Gastroesophageal reflux disease, unspecified whether esophagitis present  Nasal congestion  Postnasal drip: As above.  Check below.  Initiate famotidine.  -     Helicobacter pylori Antigen Stool; Future  -     Adult ENT  Referral; Future  -     famotidine (PEPCID) 40 MG tablet; Take 1 tablet (40 mg) by mouth daily as needed for heartburn.    Rash: likely psoriasis vs eczema.  Noted scattered on the arms/legs.  Patient has had some success with a cream in the past, though uncertain of the name.  Plan as below.   -     mometasone (ELOCON) 0.1 % external cream; Apply topically daily as needed (for rash/dry  "skin).        Return in about 2 weeks (around 2/26/2025), or if symptoms worsen or fail to improve.    The diagnoses, treatment options, risk, benefits, and recommendations were reviewed with patient/guardian.  Questions were answered to patient's/guardian satisfaction.  Red flag signs were reviewed.  Patient/guardian is in agreement with above plan.      Subjective: 34 year old male with history of asthma (diagnosed in 2022 with spirometry; maintained on ICS/LABA), tobacco use (former), depression, vaping/THC smoking and cocaine snorting who presents to clinic for the following complaints:   Patient presents with:  Pneumonia: Previously seen at [facility].   Chronic Cough: Coughing up blood and \"foam\"     Answers submitted by the patient for this visit:  Patient Health Questionnaire (Submitted on 2/12/2025)  If you checked off any problems, how difficult have these problems made it for you to do your work, take care of things at home, or get along with other people?: Extremely difficult  PHQ9 TOTAL SCORE: 27  General Questionnaire (Submitted on 2/12/2025)  Chief Complaint: Chronic problems general questions HPI Form  How many days per week do you miss taking your medication?: 0  General Concern (Submitted on 2/12/2025)  Chief Complaint: Chronic problems general questions HPI Form  What is the reason for your visit today?: becauss i need actual help  When did your symptoms begin?: More than a month  Questionnaire about: Chronic problems general questions HPI Form (Submitted on 2/12/2025)  Chief Complaint: Chronic problems general questions HPI Form    Patient was seen by pulmonology on 01/29/2025. At that visit, patient was diagnosed with cough (unspecified type). Had a bronchial challenge with methacholine. Below was the plan per lung specialist (italicized):   ASSESSMENT:  Chronic cough-upper airway cough syndrome  Chronic rhinitis  dysphonia  History of smoking tobacco, vaping, THC  Moderate persistent asthma-patient " "questions diagnosis     PLAN:   Sudafed + CTM four times daily x 2 weeks  MICT  ENT   SLP in the future     Patient reports today that he has been coughing for years.  [place] almost [place] been coughing. He has had pneumonia before, though this was likely around 2019.    In the mornings, he coughs up black stuff and green stuff during the day. He has had mutiple x-rays completed recently. His thoat hurts all the time and his voice is hoarse. His head hurts from the coughing. Chest congestion is there. He has wheezing and gurgling off and on. Right now, he is doing okay, but symptoms comes in fits. In the past 1.5 months, things have become substantially worse. He will have episodes where he is gasping for air, coughing, and wheezing. He will have to pull the car over because he coughs so much that he gets lightheaded. He has had pain on both sides of the chest now because of the coughing. He drinks tea as it is helpful for the cough. Last night, he was up most of the night coughing.     Patient reports that he has tried all kinds of OTC medications (everything that he can purchase over-the-counter within 100 miles of this clinic) and whatever he has been prescribd at [facility].    He was given some medications from the pulmonary doctor recently.  The \"pseudophed and something with the C and micinex\" and these didn't help.     [place] the coughig isnt as bad, but he still coughs[place], though this didn't help. The last time it got this hard for him to breathe, he was seen in the hospital and was given a neb and antibiotics and he was better that afernoon.  This was around 2018 or 2019.  Even though he has all of the symptoms, he can still run a 6 minute mile without difficulty.    Denies recent travels. 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. He has stopped smoking the cigarettes, pot, and drugs, but he still comes into contact with a lot of horrible stuff at work. Even prior to this job, he was coughing his lungs out.      He doesn't have any inhalers now. His albuterol is out now.  He has been using the inhaler a lot and he got to a point where he got palpitations and felt like he was going to pass out because he used the inhaler so much.  In the past, he got prednisone, but that seemed to make the breathing worse.    He has been having more reflux over the last 1.5 months. He is taking a lot of tums. Discussed treatment options.     Has a family history of heart issues. He had tachycardia so bad that he collapsed in front of his toddlers because he is using the albuterol inhaler so much.     Negative testing on 01/27/2025 for: pertussis, COVID, FLU, RSV, Strep. WBC=12.3.    He was prescribed ciprofloxacin by [facility], but patient is not sure if he got the full course of the treatment.  He was only given what ever the by the pharmacy team could dispense to him.       Red spots: noted on the arms and legs. Told that he has psoriasis or eczema. These are spots on the arms/legs. They are about the size of a monique that are dry skin and raised.     The 10 point review of system is negative except as stated in the HPI.    Allergies were reviewed and updated.    XR Chest 2 Views  Order: [id number]  Impression    The lungs are clear. There is no pleural effusion or pneumothorax. The cardiomediastinal silhouette is normal. The limited visualized portions of the upper abdomen are grossly normal.  Narrative    For Patients: As a result of the 21st Century Cures Act, medical imaging exams and procedure reports are released immediately into your electronic medical record. You may view this report before your referring provider. If you have questions, please contact your health care provider.    EXAM: XR CHEST 2 VIEWS PA AND LATERAL  LOCATION: [facility]  [place]  DATE: 1/27/2025    INDICATION: Cough, Unspecified Type  COMPARISON: 1/22/2025        XR Chest 2 Views  Order: [id number]  Impression    No acute abnormality or significant interval change as compared to the previous study.    No focal pulmonary infiltrate, pleural effusion, or pneumothorax. The cardiac size and mediastinal contours appear within normal limits.    Narrative    For Patients: As a result of the 21st Century Cures Act, medical imaging exams and procedure reports are released immediately into your electronic medical record. You may view this report before your referring provider. If you have questions, please contact your health care provider.    EXAM: XR CHEST 2 VIEWS PA AND LATERAL  LOCATION: [facility]  DATE: 1/22/2025    INDICATION: Cough, Unspecified Type  COMPARISON: 6/30/2022.      CT CHEST WO  Order: [id number]  Impression    1.  Lungs may be mildly hyperinflated.  2.  Mild mural thickening of the normal caliber bronchi and subtle peribronchiolar micronodular opacities could indicate mild nonspecific bronchial and bronchiolar inflammation in the proper clinical setting.  3.  Chest otherwise normal.  Narrative    For Patients: As a result of the 21st Century Cures Act, medical imaging exams and procedure reports are released immediately into your electronic medical record. You may view this report before your referring provider. If you have questions, please contact your health care provider.    EXAM: CT CHEST WO  LOCATION: [facility]  DATE/TIME: 7/6/2022 2:01 PM    INDICATION: Shortness of breath, cough.  COMPARISON: 6/30/22 xr, 2/27/20 xr.  TECHNIQUE: CT chest without IV contrast. Multiplanar reformats were obtained. Dose reduction techniques were used.  CONTRAST: None.    FINDINGS:  LUNGS AND PLEURA: Lungs may be mildly hyperinflated. Mild mural thickening of the normal caliber bronchi and subtle peribronchiolar micronodular opacities could indicate mild nonspecific  "bronchial and bronchiolar inflammation in the proper clinical setting. Several benign 3 mm or less subpleural pulmonary nodules require no follow-up. Lungs otherwise clear. No pleural effusion.    MEDIASTINUM/AXILLAE: No lymphadenopathy. Heart size normal. No pericardial effusion. Normal caliber thoracic aorta. Normal caliber central pulmonary arteries.    CORONARY ARTERY CALCIFICATION: None.    UPPER ABDOMEN: No significant finding.    MUSCULOSKELETAL: Unremarkable.        Objective:   /85   Pulse 74   Temp 98.4  F (36.9  C) (Oral)   Resp 18   Ht 1.81 m (5' 11.26\")   Wt 80.6 kg (177 lb 12 oz)   SpO2 97%   BMI 24.61 kg/m    General: Active, alert, nontoxic-appearing.  No acute distress.  HEENT: Normocephalic, atraumatic.  Pupils are equal and round.  Sclera is clear.  Ears: Normal external ear.  Normal, patent ear canal. Tympanic membrane: pearly, gray, transparent. Middle ear fluid is not noted.  Nasal discharge is noted. Oropharynx: moist mucous membranes. Erythema of the posterior oropharynx is not present. Tonsils do not have erythema/exudates. No masses, fluctuance, crepitus is noted of the neck.  Cervical lymphadenopathy is not noted to palpation.   Cardiac: RRR.  S1, S2 present.  No murmurs, rubs, or gallops.  Respiratory/chest: Clear to auscultation bilaterally.  No wheezes, rales, rhonchi.  Breathing is not labored.  No accessory muscle usage.  Abdomen: Soft, nondistended, nontender.  No masses or organomegaly noted.  No guarding or rebound tenderness appreciated.  Extremities: Voluntary movements intact.  Integumentary: No concerning rash or skin changes appreciated.        Nabil Zarco MD  [facility] [place] [address]  Phone: [phone]  Fax: [phone]    2/13/2025  1:56 PM          Current Outpatient Medications   Medication Sig Dispense Refill    albuterol (PROVENTIL HFA) 108 (90 Base) MCG/ACT inhaler Inhale 2 puffs into the lungs every 6 hours. 8.5 g 5 "    budesonide-formoterol (SYMBICORT/BREYNA) 160-4.5 MCG/ACT Inhaler Inhale 2 puffs into the lungs two times daily.      doxycycline hyclate (VIBRAMYCIN) 100 MG capsule Take 1 capsule (100 mg) by mouth 2 times daily for 10 days. 20 capsule 0    famotidine (PEPCID) 40 MG tablet Take 1 tablet (40 mg) by mouth daily as needed for heartburn. 30 tablet 1    mometasone (ELOCON) 0.1 % external cream Apply topically daily as needed (for rash/dry skin). 45 g 0     No current facility-administered medications for this visit.       Allergies   Allergen Reactions    Sulfa Antibiotics      Pt states when he was younger. May not be allergic anymore        Patient Active Problem List    Diagnosis Date Noted    Depression 08/30/2019     Priority: Medium     hospitalized in 2/12 [facility]      Former smoker 08/30/2019     Priority: Medium       Family History   Problem Relation Age of Onset    Diabetes Father     Heart Disease Father         Mitral Valve Replacement    Depression Son        No past surgical history on file.     Social History     Socioeconomic History    Marital status:      Spouse name: Not on file    Number of children: Not on file    Years of education: Not on file    Highest education level: Not on file   Occupational History    Not on file   Tobacco Use    Smoking status: Former     Passive exposure: Never    Smokeless tobacco: Never    Tobacco comments:     States he quit within the last few months-Used Vape pens   Vaping Use    Vaping status: Never Used   Substance and Sexual Activity    Alcohol use: Yes    Drug use: Yes     Types: Marijuana    Sexual activity: Not on file   Other Topics Concern    Not on file   Social History Narrative    Not on file     Social Drivers of Health     Financial Resource Strain: Low Risk  (5/10/2024)    Received from [facility]    Financial Resource Strain     Difficulty of Paying Living Expenses: 3     Difficulty of Paying Living  Expenses: Not on file   Food Insecurity: No Food Insecurity (5/10/2024)    Received from [facility]    Food Insecurity     Do you worry your food will run out before you are able to buy more?: 1   Transportation Needs: No Transportation Needs (5/10/2024)    Received from [facility]    Transportation Needs     Does lack of transportation keep you from medical appointments?: 1     Does lack of transportation keep you from work, meetings or getting things that you need?: 1   Physical Activity: Unknown (11/23/2020)    Received from [facility], [facility]    Exercise Vital Sign     Days of Exercise per Week: 7 days     Minutes of Exercise per Session: Not on file   Stress: Not on file   Social Connections: Socially Integrated (5/10/2024)    Received from [facility]    Social Connections     Do you often feel lonely or isolated from those around you?: 0   Interpersonal Safety: Not on file   Housing Stability: Low Risk  (5/10/2024)    Received from [facility]    Housing Stability     What is your housing situation today?: 1

## 2025-02-13 PROBLEM — R06.02 SHORTNESS OF BREATH: Status: ACTIVE | Noted: 2025-02-13

## 2025-02-13 PROBLEM — R09.82 POSTNASAL DRIP: Status: ACTIVE | Noted: 2025-02-13

## 2025-02-13 PROBLEM — K21.9 GASTROESOPHAGEAL REFLUX DISEASE, UNSPECIFIED WHETHER ESOPHAGITIS PRESENT: Status: ACTIVE | Noted: 2025-02-13

## 2025-02-13 PROBLEM — R05.3 CHRONIC COUGH: Status: ACTIVE | Noted: 2025-02-13

## 2025-04-22 ENCOUNTER — OFFICE VISIT (OUTPATIENT)
Dept: INTERNAL MEDICINE | Facility: CLINIC | Age: 35
End: 2025-04-22
Payer: COMMERCIAL

## 2025-04-22 VITALS
HEART RATE: 70 BPM | OXYGEN SATURATION: 97 % | RESPIRATION RATE: 16 BRPM | TEMPERATURE: 98.1 F | HEIGHT: 71 IN | DIASTOLIC BLOOD PRESSURE: 65 MMHG | SYSTOLIC BLOOD PRESSURE: 135 MMHG | BODY MASS INDEX: 24.16 KG/M2 | WEIGHT: 172.6 LBS

## 2025-04-22 DIAGNOSIS — Z11.3 SCREENING EXAMINATION FOR VENEREAL DISEASE: ICD-10-CM

## 2025-04-22 DIAGNOSIS — J30.1 SEASONAL ALLERGIC RHINITIS DUE TO POLLEN: ICD-10-CM

## 2025-04-22 DIAGNOSIS — J45.40 MODERATE PERSISTENT ASTHMA WITHOUT COMPLICATION: Primary | ICD-10-CM

## 2025-04-22 PROBLEM — R06.02 SHORTNESS OF BREATH: Status: RESOLVED | Noted: 2025-02-13 | Resolved: 2025-04-22

## 2025-04-22 LAB — T PALLIDUM AB SER QL: NONREACTIVE

## 2025-04-22 PROCEDURE — 87591 N.GONORRHOEAE DNA AMP PROB: CPT | Performed by: INTERNAL MEDICINE

## 2025-04-22 PROCEDURE — 86780 TREPONEMA PALLIDUM: CPT | Performed by: INTERNAL MEDICINE

## 2025-04-22 PROCEDURE — 87491 CHLMYD TRACH DNA AMP PROBE: CPT | Performed by: INTERNAL MEDICINE

## 2025-04-22 PROCEDURE — 36415 COLL VENOUS BLD VENIPUNCTURE: CPT | Performed by: INTERNAL MEDICINE

## 2025-04-22 PROCEDURE — 99214 OFFICE O/P EST MOD 30 MIN: CPT | Performed by: INTERNAL MEDICINE

## 2025-04-22 PROCEDURE — 87389 HIV-1 AG W/HIV-1&-2 AB AG IA: CPT | Performed by: INTERNAL MEDICINE

## 2025-04-22 PROCEDURE — 3078F DIAST BP <80 MM HG: CPT | Performed by: INTERNAL MEDICINE

## 2025-04-22 PROCEDURE — 3075F SYST BP GE 130 - 139MM HG: CPT | Performed by: INTERNAL MEDICINE

## 2025-04-22 RX ORDER — FLUTICASONE PROPIONATE 50 MCG
2 SPRAY, SUSPENSION (ML) NASAL DAILY
COMMUNITY
Start: 2025-04-22

## 2025-04-22 RX ORDER — CETIRIZINE HYDROCHLORIDE 10 MG/1
10 TABLET ORAL DAILY
COMMUNITY
Start: 2025-04-22

## 2025-04-22 RX ORDER — BUDESONIDE AND FORMOTEROL FUMARATE DIHYDRATE 160; 4.5 UG/1; UG/1
AEROSOL RESPIRATORY (INHALATION)
Qty: 20.4 G | Refills: 11 | Status: SHIPPED | OUTPATIENT
Start: 2025-04-22 | End: 2025-04-24

## 2025-04-22 ASSESSMENT — ASTHMA QUESTIONNAIRES: ACT_TOTALSCORE: 6

## 2025-04-22 NOTE — PATIENT INSTRUCTIONS
Safer Sex: Care Instructions  Overview  Safer sex is a way to reduce your risk of getting a sexually transmitted infection (STI). It can also help prevent pregnancy.  Several products can help you practice safer sex and reduce your chance of STIs. One of the best is a condom. There are internal and external condoms. You can use a special rubber sheet (dental dam) for protection during oral sex. Disposable gloves can keep your hands from touching blood, semen, or other body fluids that can carry infections.  Remember that birth control methods such as diaphragms, IUDs, foams, and birth control pills do not stop you from getting STIs.  Follow-up care is a key part of your treatment and safety. Be sure to make and go to all appointments, and call your doctor if you are having problems. It's also a good idea to know your test results and keep a list of the medicines you take.  How can you care for yourself at home?  Think about getting vaccinated to help prevent hepatitis A, hepatitis B, and human papillomavirus (HPV). They can be spread through sex.  Use a condom every time you have sex. Use an external condom, which goes on the penis. Or use an internal condom, which goes into the vagina or anus.  Make sure you use the right size external condom. A condom that's too small can break easily. A condom that's too big can slip off during sex.  Use a new condom each time you have sex. Be careful not to poke a hole in the condom when you open the wrapper.  Don't use an internal condom and an external condom at the same time.  Never use petroleum jelly (such as Vaseline), grease, hand lotion, baby oil, or anything with oil in it. These products can make holes in the condom.  After intercourse, hold the edge of the condom as you remove it. This will help keep semen from spilling out of the condom.  Do not have sex with anyone who has symptoms of an STI, such as sores on the genitals or mouth.  Do not drink a lot of alcohol or  "use drugs before sex.  Limit your sex partners. Sex with one partner who has sex only with you can reduce your risk of getting an STI.  Don't share sex toys. But if you do share them, use a condom and clean the sex toys between each use.  Talk to any partners before you have sex. Talk about what you feel comfortable with and whether you have any boundaries with sex. And find out if your partner or partners may be at risk for any STI. Keep in mind that a person may be able to spread an STI even if they do not have symptoms. You and any partners may want to get tested for STIs.  Where can you learn more?  Go to https://[email or web address].net/patiented  Enter B608 in the search box to learn more about \"Safer Sex: Care Instructions.\"  Current as of: April 30, 2024  Content Version: 14.4    1193-6445 [employer].   Care instructions adapted under license by your healthcare professional. If you have questions about a medical condition or this instruction, always ask your healthcare professional. [employer] disclaims any warranty or liability for your use of this information.    "

## 2025-04-22 NOTE — PROGRESS NOTES
Assessment & Plan     Moderate persistent asthma without complication  Start ICS/LABA- use this both for controller + rescue  See allergy given + RAST   - Adult Allergy/Asthma  Referral; Future  - budesonide-formoterol (SYMBICORT/BREYNA) 160-4.5 MCG/ACT Inhaler; Inhale 2 puffs twice daily plus 1 puff as needed. May use up to 12 puffs per day.    Seasonal allergic rhinitis due to pollen  - Adult Allergy/Asthma  Referral; Future  - cetirizine (ZYRTEC) 10 MG tablet; Take 1 tablet (10 mg) by mouth daily.  - fluticasone (FLONASE) 50 MCG/ACT nasal spray; Spray 2 sprays into both nostrils daily.    Screening examination for venereal disease  - Chlamydia trachomatis/Neisseria gonorrhoeae by PCR (first-voided urine); Future  - HIV Antigen Antibody Combo Cascade; Future  - Treponema Abs w Reflex to RPR and Titer; Future  - Chlamydia trachomatis/Neisseria gonorrhoeae by PCR (first-voided urine)  - HIV Antigen Antibody Combo Cascade  - Treponema Abs w Reflex to RPR and Titer            Subjective   Vijay is a 34 year old, presenting for the following health issues:  LAB REQUEST    History of Present Illness       Reason for visit:  Sexul health   He is taking medications regularly.          Concern - sti screen, pt states no concerns, no exposure that he knows of, no current sx    Asthma  Patient has not had an ACT done in this encounter: Link to ACT Flowsheet       4/22/2025     5:16 PM   ACT Total Scores   ACT TOTAL SCORE (Goal Greater than or Equal to 20) 6   In the past 12 months, how many times did you visit the emergency room for your asthma without being admitted to the hospital? 0   In the past 12 months, how many times were you hospitalized overnight because of your asthma? 0     Do you have any of the following symptoms? Cough and Noisy breathing (wheezing)  What makes your asthma/breathing worse?  Pollens  Do you want more information about how to use your inhaler? No          Objective    BP  "135/65   Pulse 70   Temp 98.1  F (36.7  C) (Temporal)   Resp 16   Ht 1.81 m (5' 11.25\")   Wt 78.3 kg (172 lb 9.6 oz)   SpO2 97%   BMI 23.90 kg/m    Body mass index is 23.9 kg/m .  Physical Exam   GENERAL: alert and no distress  RESP: no wheezes and frequent coughing  CV: regular rate and rhythm, normal S1 S2, no S3 or S4, no murmur, click or rub, no peripheral edema             Signed Electronically by: Anmol Sullivan MD    "

## 2025-04-23 ENCOUNTER — TELEPHONE (OUTPATIENT)
Dept: INTERNAL MEDICINE | Facility: CLINIC | Age: 35
End: 2025-04-23

## 2025-04-23 ENCOUNTER — PATIENT OUTREACH (OUTPATIENT)
Dept: CARE COORDINATION | Facility: CLINIC | Age: 35
End: 2025-04-23
Payer: COMMERCIAL

## 2025-04-23 ENCOUNTER — TELEPHONE (OUTPATIENT)
Dept: PEDIATRICS | Facility: CLINIC | Age: 35
End: 2025-04-23
Payer: COMMERCIAL

## 2025-04-23 DIAGNOSIS — J45.40 MODERATE PERSISTENT ASTHMA WITHOUT COMPLICATION: ICD-10-CM

## 2025-04-23 LAB
C TRACH DNA SPEC QL PROBE+SIG AMP: NEGATIVE
HIV 1+2 AB+HIV1 P24 AG SERPL QL IA: NONREACTIVE
N GONORRHOEA DNA SPEC QL NAA+PROBE: NEGATIVE
SPECIMEN TYPE: NORMAL

## 2025-04-23 NOTE — TELEPHONE ENCOUNTER
Prior Authorization Retail Medication Request    Medication/Dose: budesonide-formoterol (SYMBICORT/BREYNA) 160-4.5 MCG/ACT Inhaler  Diagnosis and ICD code (if different than what is on RX):  [J45.40]     Insurance   Primary: [facility]   Insurance ID:  [id number]

## 2025-04-23 NOTE — TELEPHONE ENCOUNTER
Prior Authorization Approval    Medication: BUDESONIDE-FORMOTEROL FUMARATE 160-4.5 MCG/ACT IN AERO  Authorization Effective Date: 4/22/2025  Authorization Expiration Date: 4/22/2026  Approved Dose/Quantity:   Reference #: [id number]   Insurance Company: Susan ([facility]) - Phone [phone] Fax [phone]  Expected CoPay: $    CoPay Card Available:      Financial Assistance Needed:   Which Pharmacy is filling the prescription: [facility] [address] [facility], [place] - [address]

## 2025-04-23 NOTE — TELEPHONE ENCOUNTER
Patient Quality Outreach    Patient is due for the following:   Asthma  -  ACT needed  Physical Preventive Adult Physical    Action(s) Taken:   Schedule a Adult Preventative    Type of outreach:    Sent [employer] message.    Questions for provider review:    None         Sinan Morales MA  Chart routed to None.

## 2025-04-24 RX ORDER — BUDESONIDE AND FORMOTEROL FUMARATE DIHYDRATE 160; 4.5 UG/1; UG/1
2 AEROSOL RESPIRATORY (INHALATION)
Qty: 10.2 G | Refills: 11 | Status: SHIPPED | OUTPATIENT
Start: 2025-04-24

## 2025-04-24 NOTE — TELEPHONE ENCOUNTER
I don't understand what the issue is with the original Rx? I don't understand their response.  DUR codes only should be used if rejected initially. Ok to use generic or brand.

## 2025-04-24 NOTE — TELEPHONE ENCOUNTER
"Called and spoke with pharmacy who stated insurance will not cover script as it is written \"up to 12 puffs per day\".     Pharmacist advised pt to use albuterol inhaler as a rescue inhaler. Pharmacist stated the pt agrees to this plan.     Pt did  Symbicort and will use it 2 puffs 2x/day.       Routing to PCP to review and advise if agrees to plan.   "

## 2025-04-26 ENCOUNTER — NURSE TRIAGE (OUTPATIENT)
Dept: INTERNAL MEDICINE | Facility: CLINIC | Age: 35
End: 2025-04-26
Payer: COMMERCIAL

## 2025-04-28 ENCOUNTER — VIRTUAL VISIT (OUTPATIENT)
Dept: INTERNAL MEDICINE | Facility: CLINIC | Age: 35
End: 2025-04-28
Payer: COMMERCIAL

## 2025-04-28 ENCOUNTER — TELEPHONE (OUTPATIENT)
Dept: PEDIATRICS | Facility: CLINIC | Age: 35
End: 2025-04-28

## 2025-04-28 DIAGNOSIS — J45.41 MODERATE PERSISTENT ASTHMA WITH ACUTE EXACERBATION: Primary | ICD-10-CM

## 2025-04-28 PROCEDURE — 98006 SYNCH AUDIO-VIDEO EST MOD 30: CPT | Performed by: INTERNAL MEDICINE

## 2025-04-28 RX ORDER — PREDNISONE 20 MG/1
TABLET ORAL
Qty: 21 TABLET | Refills: 0 | Status: SHIPPED | OUTPATIENT
Start: 2025-04-28 | End: 2025-05-12

## 2025-04-28 RX ORDER — ALBUTEROL SULFATE 1.25 MG/3ML
1.25 SOLUTION RESPIRATORY (INHALATION) EVERY 4 HOURS PRN
Qty: 90 ML | Refills: 3 | Status: SHIPPED | OUTPATIENT
Start: 2025-04-28 | End: 2025-04-28

## 2025-04-28 RX ORDER — ALBUTEROL SULFATE 0.83 MG/ML
2.5 SOLUTION RESPIRATORY (INHALATION) EVERY 6 HOURS PRN
Qty: 90 ML | Refills: 11 | Status: SHIPPED | OUTPATIENT
Start: 2025-04-28

## 2025-04-28 NOTE — PROGRESS NOTES
Vijay is a 34 year old who is being evaluated via a billable video visit.    How would you like to obtain your AVS? MyChart  If the video visit is dropped, the invitation should be resent by: Text to cell phone: [phone]  Will anyone else be joining your video visit? No      Assessment & Plan     Moderate persistent asthma with acute exacerbation  Try adding systemic steroid for a limited time- prednisone  Use symbicort bid  Ok to neb albuterol  Use albuterol or alb/budesonide as rescue   Would recommend seeing pulm/allergy -IgE testing  - predniSONE (DELTASONE) 20 MG tablet; Take 1 tablet (20 mg) by mouth 2 times daily for 7 days, THEN 1 tablet (20 mg) daily for 7 days.  - Nebulizer and Supplies Order (patient will need separate RX for meds)  - albuterol (ACCUNEB) 1.25 MG/3ML neb solution; Take 1 vial (1.25 mg) by nebulization every 4 hours as needed for shortness of breath, wheezing or cough.    The longitudinal plan of care for the diagnosis(es)/condition(s) as documented were addressed during this visit. Due to the added complexity in care, I will continue to support Vijay in the subsequent management and with ongoing continuity of care.        Subjective   Vijay is a 34 year old, presenting for the following health issues:  No chief complaint on file.    Since seen April 22 he picked up his Symbicort and has been using it as we directed both as a controller and rescue inhaler.  But in doing so he states he has been using it more than even 12 puffs a day.  (His insurance did not allow filling of the prescription for both scheduled and as needed use but only scheduled use.)  On top of this he is also been using albuterol with some relief.  But despite all this he still feels poorly coughing wheezing much of the time.  Currently he is out of town in [place] with his kids.     History of Present Illness       Reason for visit:  Asthma concerns, inhaler not working  Symptom onset:  More than a month  Symptoms  include:  Sob, cough, hoarse voice  Symptom intensity:  Moderate  Symptom progression:  Staying the same  Had these symptoms before:  Yes   He is taking medications regularly.                    Objective           Vitals:  No vitals were obtained today due to virtual visit.    Physical Exam   He seems anxious  RESP: Takes full breaths appears to be able to talk without difficulty.  Does cough frequently I do not hear any wheezing.    PSYCH: mentation appears normal and anxious          Video-Visit Details    Type of service:  Video Visit   Originating Location (pt. Location): Other automobile in parking lot    Distant Location (provider location):  On-site  Platform used for Video Visit: Rodrigo  Signed Electronically by: Anmol Sullivan MD

## 2025-04-28 NOTE — TELEPHONE ENCOUNTER
Pharmacy called and albuterol dose prescribed was pediatric dose --     Please review and advise if different dose needs to be sent in for the patient.     Routing HP as pt is at the pharmacy.     Quynh Rodriguez RN

## 2025-04-28 NOTE — TELEPHONE ENCOUNTER
"Patient was called with provider's message. He said that he has been using budesonide-formoterol (SYMBICORT/BREYNA) 160-4.5 MCG/ACT Inhaler as instructed at 4/22/25 visit: \"Inhale 2 puffs twice daily plus 1 puff as needed. May use up to 12 puffs per day.\" He is about 1/3 of inhaler out. He said he was told not to use the albuterol at that visit since Symbicort will accomplish the same thing. He is having a hard time breathing and functioning and trying to expand his career. He is traveling and is 2 hours away from [place] so he is unable to do an in person visit today. He is willing to do a virtual visit today if Dr. Sullivan thinks it will be of benefit.     "

## 2025-04-28 NOTE — TELEPHONE ENCOUNTER
"Called and spoke with pt to discuss symptoms.   He states that at this moment he is doing pretty good.   He has been using an air purifier over the weekend.     He has a wet, heavy, wheezy cough. He is hoarse.   He had a couple episodes of lightheadedness over the weekend after heavy coughing.   He has used 1/2 of the SYMBICORT 160-4.5 MCG/ACT Inhaler and is nearly out of Albuterol-Budesonide 90-80 MCG/ACT AERO .   He has not needed any inhalers this morning yet.   He reports getting tachycardia and the shakes sometimes when he uses the inhaler too much.     His symptoms are better today, but were worse over the weekend.     He will call allergy/asthma today to make an appt per referral.     Pt states he is unable to come in to be seen at all today.   He states he has been \"living like this for years now.\"    Routing to provider who saw pt recently for asthma to advise next steps.   Suggest pt need to be acutely seen? He is requesting to try a different medication for his chronic cough.       Can we leave a detailed message on this number? YES  Phone number patient can be reached at: Home number on file [phone] (home)    Abimbola Nguyễn RN  [facility] Triage    "

## 2025-04-28 NOTE — TELEPHONE ENCOUNTER
If not able to get here - as long as in state of [place] - can do virtual at 4 pm     If unable to do that-  ask these Qs  get answers to whether he is using airsupra (alb/budesonide) - that was rx'd as a result of insurance not approving prn use of symbicort.  - or just plain albuterol as well as antihistamine and steroid nasal sprays and I'll send in steroid burst   [FreeTextEntry2] : new patient

## 2025-04-28 NOTE — TELEPHONE ENCOUNTER
So pharm made us adjust rx so they would be covered- (different than from when here)  Symbicort - twice daily but not prn - so not sure why 1/2 of this inhaler would be used by now   Albuterol/budesonide- use as his rescue inhaler as needed.      Make sure he's using these as directed and not overusing beyond the max # inh/ per day.       Confirm he is using his zytrec, steroid nasal sprays as well     I can rx a burst of steroids as an option that likely will help some of the s/e might be related to overusing the inhalers.    If he can come in today - can see him end of day

## 2025-05-17 ENCOUNTER — HEALTH MAINTENANCE LETTER (OUTPATIENT)
Age: 35
End: 2025-05-17

## 2025-05-28 ENCOUNTER — PRE VISIT (OUTPATIENT)
Dept: OTOLARYNGOLOGY | Facility: CLINIC | Age: 35
End: 2025-05-28

## 2025-06-08 ENCOUNTER — NURSE TRIAGE (OUTPATIENT)
Dept: INTERNAL MEDICINE | Facility: CLINIC | Age: 35
End: 2025-06-08
Payer: COMMERCIAL

## 2025-06-08 DIAGNOSIS — J45.40 MODERATE PERSISTENT ASTHMA WITHOUT COMPLICATION: ICD-10-CM

## 2025-06-08 DIAGNOSIS — J45.41 MODERATE PERSISTENT ASTHMA WITH ACUTE EXACERBATION: ICD-10-CM

## 2025-06-09 RX ORDER — ALBUTEROL SULFATE 0.83 MG/ML
2.5 SOLUTION RESPIRATORY (INHALATION) EVERY 6 HOURS PRN
Qty: 90 ML | Refills: 11 | Status: CANCELLED | OUTPATIENT
Start: 2025-06-09

## 2025-06-09 RX ORDER — BUDESONIDE AND FORMOTEROL FUMARATE DIHYDRATE 160; 4.5 UG/1; UG/1
2 AEROSOL RESPIRATORY (INHALATION)
Qty: 10.2 G | Refills: 11 | Status: CANCELLED
Start: 2025-06-09

## 2025-06-09 NOTE — TELEPHONE ENCOUNTER
"Patient was unaware he had refills at pharmacy.     Patient is at [facility] and is getting transported to ED now.   Patient says he will stay at the ED because he is getting billed either way so hoping he is going to get some help with his symptoms.   Patient said \"They're here to bring me to the ED, I have to go\" and ended the call.   "

## 2025-06-09 NOTE — TELEPHONE ENCOUNTER
These medications were refilled ~1 month ago, with refills on board.    Additional refills are not indicated at this time.    Worried about patient clinically and emotionally - he sounds clinically unwell and on the verge of an emotional crisis. Recommend ER evaluation (preferably [facility]) and possible [facility] evaluation.     Refilling his medications and placing referrals will not help him acutely enough.     Can you ask him if there is anyone you can call to get him help ASAP? Friend or family that can watch kids while he is seen/evaluated.

## 2025-06-09 NOTE — TELEPHONE ENCOUNTER
"Nurse Triage SBAR    Is this a 2nd Level Triage? Yes     Situation: pt reporting moderate asthma, worsening. Pt has frequent cough with continued chest tightness. Triage completed pt instructed to go to ED. Pt refusing stating that he is with his two toddlers at time of call and does not have anyone to assist with childcare. Writer reinforced dispo. Pt partially agrees to be seen in UC.  Pt is also stating he is scared because his symptoms have not been getting better. Pt is requesting refills for inhalers.     Pt speaking clear, complete sentences during triage. Wheezing heard over phone while pt was coughing, otherwise wheezing was not heard at baseline.     Background:   Please see [employer] message. Pt reporting he is waiting on nebulizer machine to be sent to house.    Inhaler Symbicort is no longer helping.   Pt also reporting depression reporting this has been \"the worst year of his life\" Pt denies suicide idea or plan. Pt agrees to establish care with mental health provider. Crisis telephone numbers given, pt states he has utilized hotlines in the past.   Pt discussed financial concerns regarding being a single parent to two toddlers and financial stress related to medical bills. Please see pending care coordinator referral and sign if approved.     Pt stating he is in [facility] during time of call. Dispo reinforced to go to ER/UC now for treatment, pt continues to state he is going to call  instead. Pt reporting feeling scared because his symptoms have not been improving. Pt stating that [facility] has been the only health system that has been willing to treat him and he is not close to a  facility at time of call.   Pt teary eyed during call.     Assessment:  MD assess/tx chronic symptoms worsening. Pt to establish care with mental health. Pt to discuss financial and childcare options with care coordinator.     Protocol Recommended Disposition:   Go To ED/UCC Now (Or To Office With PCP Approval), " "Go To Office Now    Recommendation: referrals and refills pending. Routing to PCP. Please review and advise.      Routed to provider    Does the patient meet one of the following criteria for ADS visit consideration? 16+ years old, with an [facility] PCP     TIP  Providers, please consider if this condition is appropriate for management at one of our Acute and Diagnostic Services sites.     If patient is a good candidate, please use dotphrase <dot>triageresponse and select Refer to ADS to document.    Routing pending orders to PCP; please review and sign if approved.     1. RESPIRATORY STATUS: \"Describe your breathing?\" (e.g., wheezing, shortness of breath, unable to speak, severe coughing)       Pt reporting he has massive coughing fits every 15-20 minutes, wheezing.   2. ONSET: \"When did this breathing problem begin?\"       Pt reporting breathing problem worsening this past year   3. PATTERN \"Does the difficult breathing come and go, or has it been constant since it started?\"       Daily   4. SEVERITY: \"How bad is your breathing?\" (e.g., mild, moderate, severe)       Mild-moderate   5. RECURRENT SYMPTOM: \"Have you had difficulty breathing before?\" If Yes, ask: \"When was the last time?\" and \"What happened that time?\"       Yes, pt reporting he was told he was asthmatic due to failed breathing test   6. CARDIAC HISTORY: \"Do you have any history of heart disease?\" (e.g., heart attack, angina, bypass surgery, angioplasty)       Denies   7. LUNG HISTORY: \"Do you have any history of lung disease?\"  (e.g., pulmonary embolus, asthma, emphysema)      Former smoker   8. CAUSE: \"What do you think is causing the breathing problem?\"       Pt is not sure   9. OTHER SYMPTOMS: \"Do you have any other symptoms?\" (e.g., chest pain, cough, dizziness, fever, runny nose)      Runny nose at all times, rescue inhaler helps some. Symbicort relieves some symptoms but causes dizziness. Pt sits down for administration. Stating medication is no " "longer working.   10. O2 SATURATION MONITOR:  \"Do you use an oxygen saturation monitor (pulse oximeter) at home?\" If Yes, ask: \"What is your reading (oxygen level) today?\" \"What is your usual oxygen saturation reading?\" (e.g., 95%)        Unsure   11. PREGNANCY: \"Is there any chance you are pregnant?\" \"When was your last menstrual period?\"        no  12. TRAVEL: \"Have you traveled out of the country in the last month?\" (e.g., travel history, exposures)        No    Reason for Disposition   Longstanding difficulty breathing and not responding to usual therapy   Patient sounds very sick or weak to the triager    Additional Information   Negative: SEVERE difficulty breathing (e.g., struggling for each breath, speaks in single words, pulse > 120)   Negative: Breathing stopped and hasn't returned   Negative: Choking on something   Negative: Bluish (or gray) lips or face   Negative: Difficult to awaken or acting confused (e.g., disoriented, slurred speech)   Negative: Passed out (e.g., fainted, lost consciousness, blacked out and was not responding)   Negative: Wheezing started suddenly after medicine, an allergic food, or bee sting   Negative: Stridor (harsh sound while breathing in)   Negative: Slow, shallow and weak breathing   Negative: Sounds like a life-threatening emergency to the triager   Negative: Chest pain   Negative: Wheezing (high pitched whistling sound) and previous asthma attacks or use of asthma medicines   Negative: Breathing difficulty and within 14 days of COVID-19 EXPOSURE (close contact) with someone diagnosed with COVID-19 (e.g., COVID test positive)   Negative: Breathing difficulty and COVID-19 is widespread in the community   Negative: Breathing diffculty and only present when coughing   Negative: Breathing difficulty and only from stuffy nose   Negative: Breathing diffculty and only from stuffy nose or runny nose from common cold   Negative: Fever > 103 F (39.4 C)   Negative: Fever > 101 F (38.3 " "C) and over 60 years of age   Negative: Fever > 100 F (37.8 C) and bedridden (e.g., nursing home patient, stroke, chronic illness, recovering from surgery)   Negative: Fever > 100 F (37.8 C) and diabetes mellitus or weak immune system (e.g., HIV positive, cancer chemo, splenectomy, organ transplant, chronic steroids)   Negative: Periods where breathing stops and then resumes normally and bedridden (e.g., nursing home patient, CVA)   Negative: Pregnant or postpartum (from 0 to 6 weeks after delivery)   Negative: Patient sounds very sick or weak to the triager   Negative: MODERATE difficulty breathing (e.g., speaks in phrases, SOB even at rest, pulse 100-120) of new-onset or worse than normal   Negative: Oxygen level (e.g., pulse oximetry) 90% or lower   Negative: Wheezing can be heard across the room   Negative: Drooling or spitting out saliva (because can't swallow)   Negative: Any history of prior \"blood clot\" in leg or lungs   Negative: Illness requiring prolonged bedrest in past month (e.g., immobilization, long hospital stay)   Negative: Hip or leg fracture (broken bone) in past month (or had cast on leg or ankle in past month)   Negative: Major surgery in the past month   Negative: Long-distance travel in past month (e.g., car, bus, train, plane; with trip lasting 6 or more hours)   Negative: Cancer treatment in past six months (or has cancer now)   Negative: Extra heartbeats, irregular heart beating, or heart is beating very fast (i.e., 'palpitations')   Negative: Longstanding difficulty breathing (e.g., CHF, COPD, emphysema) and worse than normal   Negative: MILD difficulty breathing (e.g., minimal/no SOB at rest, SOB with walking, pulse < 100) of new-onset or worse than normal    Protocols used: Breathing Difficulty-A-OH, Depression-A-OH    "

## 2025-06-10 NOTE — TELEPHONE ENCOUNTER
Provider note below reviewed.     Same information was discussed with pharmacy in previous conversation from note written at 11:04 am, no need for follow up with pharmacy. Closing encounter.

## 2025-06-10 NOTE — TELEPHONE ENCOUNTER
Pharmacy called clinic per pt request. Pt called pharmacy this morning and told pharmacy that he spoke to someone from [facility] this morning around 0830 and was told that pt had prescriptions available at [facility] pharmacy in [place] for albuterol inhaler and Symbicort inhaler.   Pharmacy informed pt that there were no refills on file for the pt. Pharmacy called to discuss medications with [facility] staff. Writer reviewed active med list with pharmacy. Medication albuterol inhaler is not on active med list. Reviewed Symbicort order; [facility] Pharmacy verbalizes understanding and will discuss information with pt.     Routing to provider who covered for PCP yesterday. There seems to be a discrepancy between the active med list and what pt is asking for. Pt is wanting an albuterol inhaler. Pt has an albuterol neb ordered. Pt was seen in UC/ER yesterday. Please review and advise if pt provider has any additional recommendations regarding pt's inhaler request.

## 2025-06-10 NOTE — TELEPHONE ENCOUNTER
Patient also has an albuterol-budesonide inhaler available at [facility] - sent in 4/24/2025 with 2 refills.